# Patient Record
Sex: FEMALE | Race: WHITE | NOT HISPANIC OR LATINO | Employment: FULL TIME | URBAN - METROPOLITAN AREA
[De-identification: names, ages, dates, MRNs, and addresses within clinical notes are randomized per-mention and may not be internally consistent; named-entity substitution may affect disease eponyms.]

---

## 2023-02-03 PROBLEM — R11.2 NAUSEA AND VOMITING: Status: ACTIVE | Noted: 2023-02-03

## 2023-02-03 PROBLEM — K59.09 CHRONIC CONSTIPATION: Status: ACTIVE | Noted: 2023-02-03

## 2023-02-03 PROBLEM — K21.9 GASTROESOPHAGEAL REFLUX DISEASE: Status: ACTIVE | Noted: 2023-02-03

## 2023-02-03 PROBLEM — B96.89 BACTERIAL VAGINOSIS: Status: ACTIVE | Noted: 2023-02-03

## 2023-02-03 PROBLEM — K58.9 IBS (IRRITABLE BOWEL SYNDROME): Status: ACTIVE | Noted: 2023-02-03

## 2023-02-03 PROBLEM — E87.6 HYPOKALEMIA: Status: ACTIVE | Noted: 2023-02-03

## 2023-02-03 PROBLEM — E66.812 CLASS 2 SEVERE OBESITY DUE TO EXCESS CALORIES WITH SERIOUS COMORBIDITY AND BODY MASS INDEX (BMI) OF 39.0 TO 39.9 IN ADULT: Status: ACTIVE | Noted: 2023-02-03

## 2023-02-03 PROBLEM — K62.5 RECTAL BLEEDING: Status: ACTIVE | Noted: 2023-02-03

## 2023-02-03 PROBLEM — N18.2 STAGE 2 CHRONIC KIDNEY DISEASE: Status: ACTIVE | Noted: 2023-02-03

## 2023-02-03 PROBLEM — N89.8 VAGINAL ITCHING: Status: ACTIVE | Noted: 2023-02-03

## 2023-02-03 PROBLEM — N93.9 ABNORMAL UTERINE BLEEDING (AUB): Status: ACTIVE | Noted: 2023-02-03

## 2023-02-03 PROBLEM — K57.32 DIVERTICULITIS, COLON: Status: ACTIVE | Noted: 2023-02-03

## 2023-02-03 PROBLEM — N89.8 VAGINAL DISCHARGE: Status: ACTIVE | Noted: 2023-02-03

## 2023-02-03 PROBLEM — R82.998 URINE LEUKOCYTES: Status: ACTIVE | Noted: 2023-02-03

## 2023-02-03 PROBLEM — I10 ESSENTIAL HYPERTENSION: Status: ACTIVE | Noted: 2023-02-03

## 2023-02-03 PROBLEM — E66.01 CLASS 2 SEVERE OBESITY DUE TO EXCESS CALORIES WITH SERIOUS COMORBIDITY AND BODY MASS INDEX (BMI) OF 39.0 TO 39.9 IN ADULT (MULTI): Status: ACTIVE | Noted: 2023-02-03

## 2023-02-03 PROBLEM — N76.0 BACTERIAL VAGINOSIS: Status: ACTIVE | Noted: 2023-02-03

## 2023-02-03 PROBLEM — R14.0 BLOATING: Status: ACTIVE | Noted: 2023-02-03

## 2023-02-03 PROBLEM — F43.20 ADJUSTMENT DISORDER: Status: ACTIVE | Noted: 2023-02-03

## 2023-02-03 PROBLEM — K44.9 HIATAL HERNIA: Status: ACTIVE | Noted: 2023-02-03

## 2023-02-03 PROBLEM — R10.84 GENERALIZED ABDOMINAL PAIN: Status: ACTIVE | Noted: 2023-02-03

## 2023-02-03 PROBLEM — F41.8 DEPRESSION WITH ANXIETY: Status: ACTIVE | Noted: 2023-02-03

## 2023-02-03 PROBLEM — G47.33 OBSTRUCTIVE SLEEP APNEA SYNDROME: Status: ACTIVE | Noted: 2023-02-03

## 2023-02-03 PROBLEM — F41.9 ANXIETY: Status: ACTIVE | Noted: 2023-02-03

## 2023-02-03 PROBLEM — R39.11 URINARY HESITANCY: Status: ACTIVE | Noted: 2023-02-03

## 2023-02-03 PROBLEM — R19.7 DIARRHEA: Status: ACTIVE | Noted: 2023-02-03

## 2023-02-03 PROBLEM — N92.6 IRREGULAR UTERINE BLEEDING: Status: ACTIVE | Noted: 2023-02-03

## 2023-02-03 PROBLEM — B37.0 ORAL CANDIDIASIS: Status: ACTIVE | Noted: 2023-02-03

## 2023-02-03 RX ORDER — LEVONORGESTREL 52 MG/1
INTRAUTERINE DEVICE INTRAUTERINE
COMMUNITY
Start: 2022-08-17

## 2023-02-03 RX ORDER — SPIRONOLACTONE 25 MG/1
1 TABLET ORAL DAILY
COMMUNITY
Start: 2021-08-25 | End: 2023-03-22 | Stop reason: SDUPTHER

## 2023-02-03 RX ORDER — BUPROPION HYDROCHLORIDE 150 MG/1
1 TABLET ORAL DAILY
COMMUNITY
Start: 2020-11-17 | End: 2023-04-10 | Stop reason: ENTERED-IN-ERROR

## 2023-02-03 RX ORDER — PANTOPRAZOLE SODIUM 40 MG/1
1 TABLET, DELAYED RELEASE ORAL
COMMUNITY
Start: 2020-01-21 | End: 2024-02-06 | Stop reason: DRUGHIGH

## 2023-02-03 RX ORDER — PLECANATIDE 3 MG/1
TABLET ORAL
COMMUNITY
Start: 2022-08-05 | End: 2024-02-09 | Stop reason: ALTCHOICE

## 2023-02-03 RX ORDER — LOSARTAN POTASSIUM 50 MG/1
1 TABLET ORAL DAILY
COMMUNITY
Start: 2019-08-28 | End: 2023-10-12 | Stop reason: WASHOUT

## 2023-02-03 RX ORDER — ONDANSETRON 8 MG/1
1 TABLET, ORALLY DISINTEGRATING ORAL EVERY 12 HOURS PRN
COMMUNITY
Start: 2021-12-21

## 2023-02-03 RX ORDER — LABETALOL 100 MG/1
1 TABLET, FILM COATED ORAL EVERY 12 HOURS
COMMUNITY
End: 2023-07-03 | Stop reason: SDUPTHER

## 2023-02-03 RX ORDER — DICYCLOMINE HYDROCHLORIDE 20 MG/1
1 TABLET ORAL EVERY 6 HOURS PRN
COMMUNITY
Start: 2021-09-14 | End: 2024-04-19 | Stop reason: ALTCHOICE

## 2023-02-03 RX ORDER — HYDROXYZINE PAMOATE 25 MG/1
1-3 CAPSULE ORAL
COMMUNITY
Start: 2020-01-21 | End: 2024-02-09 | Stop reason: ALTCHOICE

## 2023-03-21 PROBLEM — R11.0 NAUSEA IN ADULT: Status: ACTIVE | Noted: 2023-03-21

## 2023-03-21 PROBLEM — Z20.822 SUSPECTED COVID-19 VIRUS INFECTION: Status: ACTIVE | Noted: 2023-03-21

## 2023-03-21 PROBLEM — R05.9 COUGH: Status: ACTIVE | Noted: 2023-03-21

## 2023-03-21 PROBLEM — K59.00 CONSTIPATION: Status: ACTIVE | Noted: 2023-03-21

## 2023-03-21 PROBLEM — G47.00 INSOMNIA: Status: ACTIVE | Noted: 2023-03-21

## 2023-03-21 RX ORDER — GABAPENTIN 600 MG/1
TABLET ORAL EVERY 8 HOURS
COMMUNITY
Start: 2022-06-28 | End: 2023-07-12 | Stop reason: WASHOUT

## 2023-03-21 RX ORDER — DOCUSATE SODIUM 100 MG/1
CAPSULE, LIQUID FILLED ORAL 2 TIMES DAILY
COMMUNITY
Start: 2022-06-28 | End: 2023-07-12 | Stop reason: WASHOUT

## 2023-03-21 RX ORDER — BUSPIRONE HYDROCHLORIDE 10 MG/1
TABLET ORAL 2 TIMES DAILY
COMMUNITY
End: 2023-07-12 | Stop reason: WASHOUT

## 2023-03-21 RX ORDER — NAPROXEN SODIUM 220 MG
TABLET ORAL 2 TIMES DAILY
COMMUNITY
Start: 2022-06-28 | End: 2024-02-09 | Stop reason: ALTCHOICE

## 2023-03-21 NOTE — PROGRESS NOTES
Subjective   Patient ID: Aster Pena is a 43 y.o. female who presents for follow up.    HPI     3/22/2023:  Patient complains of sharp pains in left lateral ribs.  This is more notable with deep inspiration.  She denies any cough, shortness of breath, or wheezing.  She denies any pain or swelling of the lower extremities.    Pt has HTN.  Patient does not monitor BP at home but has a machine that she can use if needed.  Denies CP, SOB, dizziness, and LE edema.   Patient is compliant with antihypertensive therapy and denies any noted side effects.     Patient has a hiatal hernia with GERD.  Her symptoms are stable on the pantoprazole.   Patient denies any breakthrough reflux symptoms.     She has hypokalemia.  Pt is maintained on supplemental potassium once a day.     Pt has anxiety / depression.   She is no longer following with psychiatry.  12/22/2022: Since stopping buspirone, she has noticed her anxiety has increased.  3/22/2023: Patient reports improved symptoms since starting on the 300 mg dose of bupropion      Review of Systems: Constitutional: Patient denies any fever, chills, loss of appetite, or unexplained weight loss.  Cardiovascular: Patient denies any chest pain, shortness of breath with exertion, tachycardia, palpitations, orthopnea, or paroxysmal nocturnal dyspnea.  Respiratory: Patient denies any cough, shortness breath, or wheezing.  Gastrointestinal: Patient denies any nausea, vomiting, diarrhea, constipation, melena, hematochezia, or reflux symptoms.  Musculoskeletal: Patient denies any myalgia, arthralgia, joint swelling, or joint deformity.  Skin: Denies any rashes or skin lesions.   Neurology: Patient denies any new motor or sensory losses. Denies any numbness, tingling, weakness, and incoordination of the extremities. Patient also denies any tremor, seizures, or gait instability.  Endocrinology: Denies any polyuria, polydipsia, polyphagia, or heat/cold intolerance.     Psychiatric:  "Denies any suicidal/homicidal ideation. Pt has anxiety and depression as noted in HPI.     SEE HISTORY OF PRESENT ILLNESS ALSO     Objective   /78   Pulse 100   Temp 36.8 °C (98.2 °F)   Resp 16   Ht 1.575 m (5' 2\")   Wt 96.2 kg (212 lb)   SpO2 96%   BMI 38.78 kg/m²     Physical Exam: Gen. appearance: Alert and cooperative, no acute distress, well-developed/well-nourished obese female.     Neck: Supple and without adenopathy, no JVD at 90° and no carotid bruits are noted. There is no thyromegaly, thyroid tenderness, or palpable thyroid nodules.  Cardiovascular: Regular rate and rhythm without murmur or ectopy.  Respiratory: Lungs are clear to auscultation bilaterally with good air exchange.  Skin: Good turgor, moist, warm and without rashes or lesions.  Neurological exam: Alert and oriented X3, no tremor, normal gait.  Extremities: No clubbing, cyanosis, or edema    MS: There is mild tenderness to palpation of the left lateral ribs and left anterior ribs just below the clavicle. No noted deformity     Psychiatric: Anxious mood and appropriate affect, good insight and judgment, no delusions or thought disorders, no suicidal or homicidal ideation.     Assessment/Plan           Chest  pain  (left lateral ribs) : D-dimer test ordered to evaluate for possible PE.     HTN: Blood pressure appears controlled on in office readings.  We will continue the current medication.  9/22/22: ASCVD risk calculated 1.48% for the next 10 years.     Hiatal hernia / GERD: diagnosed by EGD on January 6, 2020.   Stable based on symptoms.  Pt will continue on pantoprazole and we will continue with the same.  She will follow up with GI, Dr. Denson as needed.     Hypokalemia: Stable based on her most recent labs.  We will continue the potassium supplementation with current dose.     Anxiety:  Stable based on symptoms.  We will continue the current medication without changes.     Obesity: Dietary changes, exercise, and " maintenance of a healthy weight were discussed at length.     Scribe Attestation  By signing my name below, I, Kye Carrero   attest that this documentation has been prepared under the direction and in the presence of Dr. Stack.

## 2023-03-22 ENCOUNTER — LAB (OUTPATIENT)
Dept: LAB | Facility: LAB | Age: 43
End: 2023-03-22
Payer: COMMERCIAL

## 2023-03-22 ENCOUNTER — OFFICE VISIT (OUTPATIENT)
Dept: PRIMARY CARE | Facility: CLINIC | Age: 43
End: 2023-03-22
Payer: COMMERCIAL

## 2023-03-22 VITALS
WEIGHT: 212 LBS | SYSTOLIC BLOOD PRESSURE: 110 MMHG | OXYGEN SATURATION: 96 % | RESPIRATION RATE: 16 BRPM | DIASTOLIC BLOOD PRESSURE: 78 MMHG | BODY MASS INDEX: 39.01 KG/M2 | HEIGHT: 62 IN | TEMPERATURE: 98.2 F | HEART RATE: 100 BPM

## 2023-03-22 DIAGNOSIS — R07.1 CHEST PAIN ON BREATHING: ICD-10-CM

## 2023-03-22 DIAGNOSIS — E66.01 CLASS 2 SEVERE OBESITY WITH SERIOUS COMORBIDITY AND BODY MASS INDEX (BMI) OF 38.0 TO 38.9 IN ADULT, UNSPECIFIED OBESITY TYPE (MULTI): ICD-10-CM

## 2023-03-22 DIAGNOSIS — K44.9 HIATAL HERNIA: ICD-10-CM

## 2023-03-22 DIAGNOSIS — E87.6 HYPOKALEMIA: ICD-10-CM

## 2023-03-22 DIAGNOSIS — F41.9 ANXIETY: ICD-10-CM

## 2023-03-22 DIAGNOSIS — I10 ESSENTIAL HYPERTENSION: Primary | ICD-10-CM

## 2023-03-22 LAB — FIBRIN D-DIMER (NG/ML FEU) IN PLATELET POOR PLASMA: 477 NG/ML FEU

## 2023-03-22 PROCEDURE — 99214 OFFICE O/P EST MOD 30 MIN: CPT | Performed by: FAMILY MEDICINE

## 2023-03-22 PROCEDURE — 3078F DIAST BP <80 MM HG: CPT | Performed by: FAMILY MEDICINE

## 2023-03-22 PROCEDURE — 1036F TOBACCO NON-USER: CPT | Performed by: FAMILY MEDICINE

## 2023-03-22 PROCEDURE — 3074F SYST BP LT 130 MM HG: CPT | Performed by: FAMILY MEDICINE

## 2023-03-22 PROCEDURE — 36415 COLL VENOUS BLD VENIPUNCTURE: CPT

## 2023-03-22 PROCEDURE — 3008F BODY MASS INDEX DOCD: CPT | Performed by: FAMILY MEDICINE

## 2023-03-22 PROCEDURE — 85379 FIBRIN DEGRADATION QUANT: CPT

## 2023-03-22 RX ORDER — BUPROPION HYDROCHLORIDE 300 MG/1
300 TABLET ORAL EVERY MORNING
COMMUNITY
Start: 2022-12-22 | End: 2023-04-10 | Stop reason: SDUPTHER

## 2023-03-22 RX ORDER — SPIRONOLACTONE 25 MG/1
25 TABLET ORAL DAILY
Qty: 90 TABLET | Refills: 0 | Status: SHIPPED | OUTPATIENT
Start: 2023-03-22 | End: 2023-08-01 | Stop reason: SDUPTHER

## 2023-03-24 ENCOUNTER — PATIENT OUTREACH (OUTPATIENT)
Dept: CARE COORDINATION | Facility: CLINIC | Age: 43
End: 2023-03-24
Payer: COMMERCIAL

## 2023-04-04 ENCOUNTER — PATIENT MESSAGE (OUTPATIENT)
Dept: PRIMARY CARE | Facility: CLINIC | Age: 43
End: 2023-04-04
Payer: COMMERCIAL

## 2023-04-04 DIAGNOSIS — R10.30 LOWER ABDOMINAL PAIN: Primary | ICD-10-CM

## 2023-04-06 ENCOUNTER — LAB (OUTPATIENT)
Dept: LAB | Facility: LAB | Age: 43
End: 2023-04-06
Payer: COMMERCIAL

## 2023-04-06 ENCOUNTER — TELEPHONE (OUTPATIENT)
Dept: PRIMARY CARE | Facility: CLINIC | Age: 43
End: 2023-04-06

## 2023-04-06 DIAGNOSIS — R10.30 LOWER ABDOMINAL PAIN: ICD-10-CM

## 2023-04-06 DIAGNOSIS — N39.0 URINARY TRACT INFECTION WITHOUT HEMATURIA, SITE UNSPECIFIED: Primary | ICD-10-CM

## 2023-04-06 LAB
APPEARANCE, URINE: ABNORMAL
BACTERIA, URINE: ABNORMAL /HPF
BILIRUBIN, URINE: NEGATIVE
BLOOD, URINE: NEGATIVE
COLOR, URINE: YELLOW
GLUCOSE, URINE: NEGATIVE MG/DL
KETONES, URINE: NEGATIVE MG/DL
LEUKOCYTE ESTERASE, URINE: ABNORMAL
MUCUS, URINE: ABNORMAL /LPF
NITRITE, URINE: NEGATIVE
PH, URINE: 5 (ref 5–8)
PROTEIN, URINE: NEGATIVE MG/DL
RBC, URINE: ABNORMAL /HPF (ref 0–5)
SPECIFIC GRAVITY, URINE: 1.01 (ref 1–1.03)
SQUAMOUS EPITHELIAL CELLS, URINE: 7 /HPF
UROBILINOGEN, URINE: <2 MG/DL (ref 0–1.9)
WBC, URINE: 38 /HPF (ref 0–5)

## 2023-04-06 PROCEDURE — 81001 URINALYSIS AUTO W/SCOPE: CPT

## 2023-04-06 RX ORDER — NITROFURANTOIN 25; 75 MG/1; MG/1
100 CAPSULE ORAL 2 TIMES DAILY
Qty: 14 CAPSULE | Refills: 0 | Status: SHIPPED | OUTPATIENT
Start: 2023-04-06 | End: 2023-04-13

## 2023-04-06 NOTE — TELEPHONE ENCOUNTER
Advise pt that her urine does show signs of an infection.  We will start her on an antibiotic.    Which pharmacy would she prefer?

## 2023-04-10 DIAGNOSIS — F41.9 ANXIETY: Primary | ICD-10-CM

## 2023-04-10 RX ORDER — BUPROPION HYDROCHLORIDE 300 MG/1
300 TABLET ORAL EVERY MORNING
Qty: 90 TABLET | Refills: 0 | Status: SHIPPED | OUTPATIENT
Start: 2023-04-10 | End: 2023-07-03 | Stop reason: SDUPTHER

## 2023-06-06 ENCOUNTER — PATIENT MESSAGE (OUTPATIENT)
Dept: PRIMARY CARE | Facility: CLINIC | Age: 43
End: 2023-06-06
Payer: COMMERCIAL

## 2023-06-06 DIAGNOSIS — R39.9 UTI SYMPTOMS: Primary | ICD-10-CM

## 2023-06-07 ENCOUNTER — TREATMENT (OUTPATIENT)
Dept: PRIMARY CARE | Facility: CLINIC | Age: 43
End: 2023-06-07
Payer: COMMERCIAL

## 2023-06-07 ENCOUNTER — TELEPHONE (OUTPATIENT)
Dept: PRIMARY CARE | Facility: CLINIC | Age: 43
End: 2023-06-07
Payer: COMMERCIAL

## 2023-06-07 DIAGNOSIS — N39.0 URINARY TRACT INFECTION WITHOUT HEMATURIA, SITE UNSPECIFIED: Primary | ICD-10-CM

## 2023-06-07 RX ORDER — NITROFURANTOIN 25; 75 MG/1; MG/1
100 CAPSULE ORAL 2 TIMES DAILY
Qty: 14 CAPSULE | Refills: 0 | Status: SHIPPED | OUTPATIENT
Start: 2023-06-07 | End: 2023-06-14

## 2023-06-07 NOTE — TELEPHONE ENCOUNTER
Advise pt that her urine looks suspicious for a UTI.  We will start her on an antibiotic while we wait for the culture results.    RX sent to the [facility].

## 2023-06-27 ENCOUNTER — APPOINTMENT (OUTPATIENT)
Dept: PRIMARY CARE | Facility: CLINIC | Age: 43
End: 2023-06-27
Payer: COMMERCIAL

## 2023-07-03 DIAGNOSIS — F43.29 ADJUSTMENT DISORDER WITH OTHER SYMPTOM: Primary | ICD-10-CM

## 2023-07-03 DIAGNOSIS — F41.9 ANXIETY: ICD-10-CM

## 2023-07-03 RX ORDER — LABETALOL 100 MG/1
1 TABLET, FILM COATED ORAL EVERY 12 HOURS
Qty: 90 TABLET | Refills: 0 | Status: SHIPPED | OUTPATIENT
Start: 2023-07-03 | End: 2023-07-03

## 2023-07-03 RX ORDER — BUPROPION HYDROCHLORIDE 300 MG/1
300 TABLET ORAL EVERY MORNING
Qty: 90 TABLET | Refills: 0 | Status: SHIPPED | OUTPATIENT
Start: 2023-07-03 | End: 2023-08-14 | Stop reason: SDUPTHER

## 2023-07-12 ENCOUNTER — OFFICE VISIT (OUTPATIENT)
Dept: PRIMARY CARE | Facility: CLINIC | Age: 43
End: 2023-07-12
Payer: COMMERCIAL

## 2023-07-12 VITALS
HEIGHT: 62 IN | HEART RATE: 94 BPM | BODY MASS INDEX: 38.61 KG/M2 | DIASTOLIC BLOOD PRESSURE: 84 MMHG | TEMPERATURE: 98.1 F | SYSTOLIC BLOOD PRESSURE: 132 MMHG | OXYGEN SATURATION: 95 % | WEIGHT: 209.8 LBS

## 2023-07-12 DIAGNOSIS — K21.9 GASTROESOPHAGEAL REFLUX DISEASE WITHOUT ESOPHAGITIS: ICD-10-CM

## 2023-07-12 DIAGNOSIS — K44.9 HIATAL HERNIA: ICD-10-CM

## 2023-07-12 DIAGNOSIS — E66.01 CLASS 2 SEVERE OBESITY WITH SERIOUS COMORBIDITY AND BODY MASS INDEX (BMI) OF 38.0 TO 38.9 IN ADULT, UNSPECIFIED OBESITY TYPE (MULTI): ICD-10-CM

## 2023-07-12 DIAGNOSIS — I10 ESSENTIAL HYPERTENSION: Primary | ICD-10-CM

## 2023-07-12 DIAGNOSIS — E87.6 HYPOKALEMIA: ICD-10-CM

## 2023-07-12 DIAGNOSIS — R41.3 MEMORY CHANGES: ICD-10-CM

## 2023-07-12 DIAGNOSIS — R35.0 URINARY FREQUENCY: ICD-10-CM

## 2023-07-12 DIAGNOSIS — R82.90 ABNORMAL URINE FINDING: ICD-10-CM

## 2023-07-12 DIAGNOSIS — F41.9 ANXIETY: ICD-10-CM

## 2023-07-12 PROBLEM — R13.12 OROPHARYNGEAL DYSPHAGIA: Status: ACTIVE | Noted: 2023-07-12

## 2023-07-12 PROBLEM — R53.83 FATIGUE: Status: ACTIVE | Noted: 2023-07-12

## 2023-07-12 LAB
POC APPEARANCE, URINE: ABNORMAL
POC BILIRUBIN, URINE: NEGATIVE
POC BLOOD, URINE: NEGATIVE
POC COLOR, URINE: YELLOW
POC GLUCOSE, URINE: NEGATIVE MG/DL
POC KETONES, URINE: NEGATIVE MG/DL
POC LEUKOCYTES, URINE: ABNORMAL
POC NITRITE,URINE: NEGATIVE
POC PH, URINE: 7.5 PH
POC PROTEIN, URINE: NEGATIVE MG/DL
POC SPECIFIC GRAVITY, URINE: 1.01
POC UROBILINOGEN, URINE: 0.2 EU/DL

## 2023-07-12 PROCEDURE — 99214 OFFICE O/P EST MOD 30 MIN: CPT | Performed by: FAMILY MEDICINE

## 2023-07-12 PROCEDURE — 81003 URINALYSIS AUTO W/O SCOPE: CPT | Performed by: FAMILY MEDICINE

## 2023-07-12 PROCEDURE — 1036F TOBACCO NON-USER: CPT | Performed by: FAMILY MEDICINE

## 2023-07-12 PROCEDURE — 3079F DIAST BP 80-89 MM HG: CPT | Performed by: FAMILY MEDICINE

## 2023-07-12 PROCEDURE — 3008F BODY MASS INDEX DOCD: CPT | Performed by: FAMILY MEDICINE

## 2023-07-12 PROCEDURE — 3075F SYST BP GE 130 - 139MM HG: CPT | Performed by: FAMILY MEDICINE

## 2023-07-12 PROCEDURE — 87086 URINE CULTURE/COLONY COUNT: CPT

## 2023-07-12 ASSESSMENT — PATIENT HEALTH QUESTIONNAIRE - PHQ9
1. LITTLE INTEREST OR PLEASURE IN DOING THINGS: NOT AT ALL
2. FEELING DOWN, DEPRESSED OR HOPELESS: NOT AT ALL
SUM OF ALL RESPONSES TO PHQ9 QUESTIONS 1 AND 2: 0

## 2023-07-12 NOTE — PATIENT INSTRUCTIONS
Follow up in 3 months.    It was a pleasure to see you today. Thank you for choosing us for your health care needs.    If you have lab or other testing completed and have not been informed of results within one week, please call the office for your results.    If you haven't done so, consider signing up for [facility] [employer], the [facility] personal health record. Ask the staff how you can get started.

## 2023-07-12 NOTE — PROGRESS NOTES
Subjective   Patient ID: Aster Pena is a 43 y.o. female who presents for Follow-up.    HPI     Patient states she's been having frequent UTIs. The sx she's still experiencing nausea and vomiting, abdominal pain, and urinary frequency.    Patient is also concerned about her memory. She states he forgets events a few minutes after they have happened at times.  Has noted this for the last 3-4 months.  No fam hx of dementia.      Pt has HTN.  Patient does not monitor BP at home but has a machine that she can use if needed.  Denies CP, SOB, dizziness, and LE edema.   Patient is compliant with antihypertensive therapy and denies any noted side effects.     Patient has a hiatal hernia with GERD.  Her symptoms are stable on the pantoprazole.   Patient denies any breakthrough reflux symptoms.     She has hypokalemia.  Pt is maintained on supplemental potassium once a day.     Pt has anxiety / depression.   She was following with a grief group but no longer thought she needed to and symptoms have remained stable.  As previously noted, patient was seen and evaluated by psychiatry at [facility].  She is no longer following with psychiatry.  12/22/2022: Since stopping buspirone, she has noticed her anxiety has increased.      Review of Systems  Constitutional: Patient denies any fever, chills, loss of appetite, or unexplained weight loss.  Cardiovascular: Patient denies any chest pain, shortness of breath with exertion, tachycardia, palpitations, orthopnea, or paroxysmal nocturnal dyspnea.  Respiratory: Patient denies any cough, shortness breath, or wheezing.  Gastrointestinal: Patient denies any nausea, vomiting, diarrhea, constipation, melena, hematochezia, or reflux symptoms.  Skin: Denies any rashes or skin lesions.   Neurology: Patient denies any new motor or sensory losses.  Denies any numbness, tingling, weakness, and incoordination of the extremities.  Patient also denies any tremor, seizures, or gait  "instability.  Endocrinology: Denies any polyuria, polydipsia, polyphagia, or heat/cold intolerance.    SEE HISTORY OF PRESENT ILLNESS ALSO WITH    Objective   /84   Pulse 94   Temp 36.7 °C (98.1 °F)   Ht 1.575 m (5' 2\")   Wt 95.2 kg (209 lb 12.8 oz)   SpO2 95%   BMI 38.37 kg/m²     Physical Exam  General Appearance: Alert and cooperative, in no acute distress, well-developed/well-nourished.  Neck: Supple and without adenopathy or rigidity.  There is no JVD at 90° and no carotid bruits are noted.  There is no thyromegaly, thyroid tenderness, or palpable thyroid nodules.  Heart: Regular rate and rhythm without murmur or ectopy.  Respiratory: Lungs are clear to auscultation bilaterally with good air exchange.  Good respiratory effort and no accessory muscle use.  Skin: Good turgor, moist, warm and without rashes or lesions.  Neurological exam: Alert and oriented ×3, no tremor, normal gait.  Extremities: No clubbing, cyanosis, or edema  Abdomen: Soft, nontender/nondistended.  No masses, guarding, rebound, or rigidity.  No CVA tenderness.        Assessment/Plan     HTN: Blood pressure appears controlled on in office readings.  We will continue the current medication.     Hiatal hernia / GERD: diagnosed by EGD on January 6, 2020.   Stable based on symptoms.  Pt will continue on pantoprazole and we will continue with the same.  She will follow up with GI, Dr. Denson as needed.     Hypokalemia: Stable based on her most recent labs.  We will continue the potassium supplementation with current dose.     Anxiety:  Stable based on symptoms.  Continue the current medications.     Obesity: Dietary changes, exercise, and maintenance of a healthy weight were discussed at length.      Urinary frequency: In office urinalysis revealed small leukocytes but was otherwise negative.  Urine culture has been ordered for further evaluation.  7/12/2023: We will refer her to urology due to her complaints of frequent " UTI.    Memory changes:  7/12/2023: We will refer her to neurology for additional evaluation.      Orders Placed This Encounter   Procedures    Urine Culture    Referral to Urology    Referral to Neurology    POCT UA Automated manually resulted

## 2023-07-14 LAB — URINE CULTURE: NORMAL

## 2023-08-01 DIAGNOSIS — I10 ESSENTIAL HYPERTENSION: ICD-10-CM

## 2023-08-02 RX ORDER — SPIRONOLACTONE 25 MG/1
25 TABLET ORAL DAILY
Qty: 90 TABLET | Refills: 0 | Status: SHIPPED | OUTPATIENT
Start: 2023-08-02 | End: 2023-08-03 | Stop reason: SDUPTHER

## 2023-08-03 DIAGNOSIS — F41.9 ANXIETY: ICD-10-CM

## 2023-08-03 DIAGNOSIS — I10 ESSENTIAL HYPERTENSION: ICD-10-CM

## 2023-08-14 RX ORDER — SPIRONOLACTONE 25 MG/1
25 TABLET ORAL DAILY
Qty: 90 TABLET | Refills: 0 | Status: SHIPPED | OUTPATIENT
Start: 2023-08-14 | End: 2023-08-14

## 2023-08-14 RX ORDER — BUPROPION HYDROCHLORIDE 300 MG/1
300 TABLET ORAL EVERY MORNING
Qty: 90 TABLET | Refills: 0 | Status: SHIPPED | OUTPATIENT
Start: 2023-08-14 | End: 2023-08-14

## 2023-10-08 DIAGNOSIS — F41.9 ANXIETY: ICD-10-CM

## 2023-10-09 ENCOUNTER — PHARMACY VISIT (OUTPATIENT)
Dept: PHARMACY | Facility: CLINIC | Age: 43
End: 2023-10-09
Payer: COMMERCIAL

## 2023-10-09 PROCEDURE — RXMED WILLOW AMBULATORY MEDICATION CHARGE

## 2023-10-11 RX ORDER — LABETALOL 100 MG/1
TABLET, FILM COATED ORAL
Qty: 90 TABLET | Refills: 0 | OUTPATIENT
Start: 2023-10-11 | End: 2024-10-10

## 2023-10-12 ENCOUNTER — PHARMACY VISIT (OUTPATIENT)
Dept: PHARMACY | Facility: CLINIC | Age: 43
End: 2023-10-12
Payer: COMMERCIAL

## 2023-10-12 ENCOUNTER — OFFICE VISIT (OUTPATIENT)
Dept: PRIMARY CARE | Facility: CLINIC | Age: 43
End: 2023-10-12
Payer: COMMERCIAL

## 2023-10-12 VITALS
SYSTOLIC BLOOD PRESSURE: 130 MMHG | BODY MASS INDEX: 37.36 KG/M2 | TEMPERATURE: 97.6 F | DIASTOLIC BLOOD PRESSURE: 80 MMHG | HEART RATE: 95 BPM | OXYGEN SATURATION: 97 % | WEIGHT: 203 LBS | HEIGHT: 62 IN

## 2023-10-12 DIAGNOSIS — F41.9 ANXIETY: ICD-10-CM

## 2023-10-12 DIAGNOSIS — E66.01 CLASS 2 SEVERE OBESITY WITH SERIOUS COMORBIDITY AND BODY MASS INDEX (BMI) OF 38.0 TO 38.9 IN ADULT, UNSPECIFIED OBESITY TYPE (MULTI): ICD-10-CM

## 2023-10-12 DIAGNOSIS — E87.6 HYPOKALEMIA: ICD-10-CM

## 2023-10-12 DIAGNOSIS — K21.9 GASTROESOPHAGEAL REFLUX DISEASE WITHOUT ESOPHAGITIS: ICD-10-CM

## 2023-10-12 DIAGNOSIS — K44.9 HIATAL HERNIA: ICD-10-CM

## 2023-10-12 DIAGNOSIS — I10 ESSENTIAL HYPERTENSION: Primary | ICD-10-CM

## 2023-10-12 PROCEDURE — 3008F BODY MASS INDEX DOCD: CPT | Performed by: FAMILY MEDICINE

## 2023-10-12 PROCEDURE — 3079F DIAST BP 80-89 MM HG: CPT | Performed by: FAMILY MEDICINE

## 2023-10-12 PROCEDURE — 1036F TOBACCO NON-USER: CPT | Performed by: FAMILY MEDICINE

## 2023-10-12 PROCEDURE — 3075F SYST BP GE 130 - 139MM HG: CPT | Performed by: FAMILY MEDICINE

## 2023-10-12 PROCEDURE — 99214 OFFICE O/P EST MOD 30 MIN: CPT | Performed by: FAMILY MEDICINE

## 2023-10-12 RX ORDER — LABETALOL 100 MG/1
TABLET, FILM COATED ORAL
Qty: 90 TABLET | Refills: 0 | Status: SHIPPED | OUTPATIENT
Start: 2023-10-12 | End: 2024-02-18 | Stop reason: SDUPTHER

## 2023-10-12 ASSESSMENT — PATIENT HEALTH QUESTIONNAIRE - PHQ9
1. LITTLE INTEREST OR PLEASURE IN DOING THINGS: NOT AT ALL
SUM OF ALL RESPONSES TO PHQ9 QUESTIONS 1 AND 2: 0
2. FEELING DOWN, DEPRESSED OR HOPELESS: NOT AT ALL

## 2023-10-12 NOTE — PROGRESS NOTES
"Subjective   Patient ID: Aster Pena is a 43 y.o. female who presents for follow up monitoring and management of multiple medical conditions.      HPI     NO NEW CONCERNS  No labs   Colonoscopy 2020        Pt has HTN.  Patient does not monitor BP at home but has a machine that she can use if needed.  Denies CP, SOB, dizziness, and LE edema.   Patient is compliant with antihypertensive therapy and denies any noted side effects.     Patient has a hiatal hernia with GERD.  Her symptoms are stable on the pantoprazole.   Patient denies any breakthrough reflux symptoms.     She has hypokalemia.  Pt is maintained on supplemental potassium once a day.     Pt has anxiety / depression.   She was following with a grief group but no longer thought she needed to and symptoms have remained stable.  As previously noted, patient was seen and evaluated by psychiatry at [facility].  She is no longer following with psychiatry.      DECLINES FLU VACCINE    Review of Systems  Constitutional: Patient denies any fever, chills, loss of appetite, or unexplained weight loss.  Cardiovascular: Patient denies any chest pain, shortness of breath with exertion, tachycardia, palpitations, orthopnea, or paroxysmal nocturnal dyspnea.  Respiratory: Patient denies any cough, shortness breath, or wheezing.  Gastrointestinal: Patient denies any nausea, vomiting, diarrhea, constipation, melena, hematochezia, or reflux symptoms.  Skin: Denies any rashes or skin lesions.   Neurology: Patient denies any new motor or sensory losses.  Denies any numbness, tingling, weakness, and incoordination of the extremities.  Patient also denies any tremor, seizures, or gait instability.  Endocrinology: Denies any polyuria, polydipsia, polyphagia, or heat/cold intolerance.      Objective   /80   Pulse 95   Temp 36.4 °C (97.6 °F)   Ht 1.575 m (5' 2\")   Wt 92.1 kg (203 lb)   SpO2 97%   BMI 37.13 kg/m²     Physical Exam  General Appearance: Alert and " cooperative, in no acute distress, well-developed/well-nourished.  Neck: Supple and without adenopathy or rigidity.  There is no JVD at 90° and no carotid bruits are noted.  There is no thyromegaly, thyroid tenderness, or palpable thyroid nodules.  Heart: Regular rate and rhythm without murmur or ectopy.  Respiratory: Lungs are clear to auscultation bilaterally with good air exchange.  Good respiratory effort and no accessory muscle use.  Skin: Good turgor, moist, warm and without rashes or lesions.  Neurological exam: Alert and oriented ×3, no tremor, normal gait.  Extremities: No clubbing, cyanosis, or edema    Assessment/Plan     WILL GET FLU VACCINE AT WORK.    HTN: Blood pressure appears controlled on in office readings.  We will continue the current medication.     Hiatal hernia / GERD:  Stable based on symptoms.  Pt will continue on pantoprazole and we will continue with the same.  She will follow up with GI, Dr. Denson as needed.     Hypokalemia: Stable based on her most recent labs.  We will continue the potassium supplementation with current dose.     Anxiety:  Stable based on symptoms.  Continue the current medications.     Obesity: Dietary changes, exercise, and maintenance of a healthy weight were discussed at length.       Memory changes:  7/12/2023: She was referred to neurology for additional evaluation.  10/12/2023:  Has not yet scheduled appt with neurology for evaluation yet.      Orders Placed This Encounter   Procedures    Comprehensive Metabolic Panel    Lipid Panel     Requested Prescriptions     Signed Prescriptions Disp Refills    labetalol (Normodyne) 100 mg tablet 90 tablet 0     Sig: TAKE 1 TABLET (100 MG) BY MOUTH EVERY 12 HOURS.

## 2023-10-12 NOTE — PATIENT INSTRUCTIONS
Get your flu vaccine at work.      Follow up in 3 months with labs to be done PRIOR.    It was a pleasure to see you today. Thank you for choosing us for your health care needs.    If you have lab or other testing completed and have not been informed of results within one week, please call the office for your results.    If you haven't done so, consider signing up for [facility] [employer], the [facility] personal health record. Ask the staff how you can get started.

## 2023-10-18 ENCOUNTER — APPOINTMENT (OUTPATIENT)
Dept: GASTROENTEROLOGY | Facility: CLINIC | Age: 43
End: 2023-10-18
Payer: COMMERCIAL

## 2023-10-30 ENCOUNTER — PHARMACY VISIT (OUTPATIENT)
Dept: PHARMACY | Facility: CLINIC | Age: 43
End: 2023-10-30
Payer: COMMERCIAL

## 2023-10-30 PROCEDURE — RXMED WILLOW AMBULATORY MEDICATION CHARGE

## 2023-12-07 ENCOUNTER — LAB (OUTPATIENT)
Dept: LAB | Facility: LAB | Age: 43
End: 2023-12-07
Payer: COMMERCIAL

## 2023-12-07 DIAGNOSIS — R39.9 UTI SYMPTOMS: ICD-10-CM

## 2023-12-07 LAB
APPEARANCE UR: ABNORMAL
BACTERIA #/AREA URNS AUTO: ABNORMAL /HPF
BILIRUB UR STRIP.AUTO-MCNC: NEGATIVE MG/DL
COLOR UR: YELLOW
GLUCOSE UR STRIP.AUTO-MCNC: NEGATIVE MG/DL
KETONES UR STRIP.AUTO-MCNC: NEGATIVE MG/DL
LEUKOCYTE ESTERASE UR QL STRIP.AUTO: ABNORMAL
NITRITE UR QL STRIP.AUTO: NEGATIVE
PH UR STRIP.AUTO: 7 [PH]
PROT UR STRIP.AUTO-MCNC: NEGATIVE MG/DL
RBC # UR STRIP.AUTO: NEGATIVE /UL
RBC #/AREA URNS AUTO: ABNORMAL /HPF
SP GR UR STRIP.AUTO: 1.01
SQUAMOUS #/AREA URNS AUTO: ABNORMAL /HPF
UROBILINOGEN UR STRIP.AUTO-MCNC: <2 MG/DL
WBC #/AREA URNS AUTO: ABNORMAL /HPF

## 2023-12-07 PROCEDURE — 81001 URINALYSIS AUTO W/SCOPE: CPT

## 2024-01-02 DIAGNOSIS — I10 ESSENTIAL HYPERTENSION: ICD-10-CM

## 2024-01-02 DIAGNOSIS — F41.9 ANXIETY: ICD-10-CM

## 2024-01-02 PROCEDURE — RXMED WILLOW AMBULATORY MEDICATION CHARGE

## 2024-01-06 PROCEDURE — RXMED WILLOW AMBULATORY MEDICATION CHARGE

## 2024-01-06 RX ORDER — BUPROPION HYDROCHLORIDE 300 MG/1
300 TABLET ORAL
Qty: 90 TABLET | Refills: 0 | Status: SHIPPED | OUTPATIENT
Start: 2024-01-06 | End: 2024-05-14 | Stop reason: SDUPTHER

## 2024-01-06 RX ORDER — SPIRONOLACTONE 25 MG/1
25 TABLET ORAL DAILY
Qty: 90 TABLET | Refills: 0 | Status: SHIPPED | OUTPATIENT
Start: 2024-01-06 | End: 2024-05-14 | Stop reason: SDUPTHER

## 2024-01-11 ENCOUNTER — PHARMACY VISIT (OUTPATIENT)
Dept: PHARMACY | Facility: CLINIC | Age: 44
End: 2024-01-11
Payer: COMMERCIAL

## 2024-01-18 ENCOUNTER — APPOINTMENT (OUTPATIENT)
Dept: PRIMARY CARE | Facility: CLINIC | Age: 44
End: 2024-01-18
Payer: COMMERCIAL

## 2024-02-02 ENCOUNTER — LAB (OUTPATIENT)
Dept: LAB | Facility: LAB | Age: 44
End: 2024-02-02
Payer: COMMERCIAL

## 2024-02-02 DIAGNOSIS — I10 ESSENTIAL HYPERTENSION: ICD-10-CM

## 2024-02-02 DIAGNOSIS — E87.6 HYPOKALEMIA: ICD-10-CM

## 2024-02-02 LAB
ALBUMIN SERPL BCP-MCNC: 4 G/DL (ref 3.4–5)
ALP SERPL-CCNC: 66 U/L (ref 33–110)
ALT SERPL W P-5'-P-CCNC: 10 U/L (ref 7–45)
ANION GAP SERPL CALC-SCNC: 12 MMOL/L (ref 10–20)
AST SERPL W P-5'-P-CCNC: 14 U/L (ref 9–39)
BILIRUB SERPL-MCNC: 0.5 MG/DL (ref 0–1.2)
BUN SERPL-MCNC: 9 MG/DL (ref 6–23)
CALCIUM SERPL-MCNC: 8.7 MG/DL (ref 8.6–10.3)
CHLORIDE SERPL-SCNC: 101 MMOL/L (ref 98–107)
CO2 SERPL-SCNC: 29 MMOL/L (ref 21–32)
CREAT SERPL-MCNC: 0.9 MG/DL (ref 0.5–1.05)
EGFRCR SERPLBLD CKD-EPI 2021: 82 ML/MIN/1.73M*2
GLUCOSE SERPL-MCNC: 96 MG/DL (ref 74–99)
POTASSIUM SERPL-SCNC: 3.7 MMOL/L (ref 3.5–5.3)
PROT SERPL-MCNC: 7 G/DL (ref 6.4–8.2)
SODIUM SERPL-SCNC: 138 MMOL/L (ref 136–145)

## 2024-02-02 PROCEDURE — 80053 COMPREHEN METABOLIC PANEL: CPT

## 2024-02-02 PROCEDURE — 36415 COLL VENOUS BLD VENIPUNCTURE: CPT

## 2024-02-06 ENCOUNTER — OFFICE VISIT (OUTPATIENT)
Dept: GASTROENTEROLOGY | Facility: CLINIC | Age: 44
End: 2024-02-06
Payer: COMMERCIAL

## 2024-02-06 VITALS
WEIGHT: 198 LBS | HEIGHT: 62 IN | DIASTOLIC BLOOD PRESSURE: 90 MMHG | BODY MASS INDEX: 36.44 KG/M2 | SYSTOLIC BLOOD PRESSURE: 136 MMHG | HEART RATE: 98 BPM

## 2024-02-06 DIAGNOSIS — K21.9 GASTROESOPHAGEAL REFLUX DISEASE WITHOUT ESOPHAGITIS: Primary | ICD-10-CM

## 2024-02-06 DIAGNOSIS — R11.14 BILIOUS VOMITING WITH NAUSEA: ICD-10-CM

## 2024-02-06 PROCEDURE — 3008F BODY MASS INDEX DOCD: CPT | Performed by: INTERNAL MEDICINE

## 2024-02-06 PROCEDURE — 3080F DIAST BP >= 90 MM HG: CPT | Performed by: INTERNAL MEDICINE

## 2024-02-06 PROCEDURE — 1036F TOBACCO NON-USER: CPT | Performed by: INTERNAL MEDICINE

## 2024-02-06 PROCEDURE — 3075F SYST BP GE 130 - 139MM HG: CPT | Performed by: INTERNAL MEDICINE

## 2024-02-06 PROCEDURE — 99214 OFFICE O/P EST MOD 30 MIN: CPT | Performed by: INTERNAL MEDICINE

## 2024-02-06 RX ORDER — PANTOPRAZOLE SODIUM 40 MG/1
40 TABLET, DELAYED RELEASE ORAL
Qty: 180 TABLET | Refills: 1 | Status: SHIPPED | OUTPATIENT
Start: 2024-02-06 | End: 2025-02-05

## 2024-02-06 NOTE — PATIENT INSTRUCTIONS
Schedule EGD and Gastric emptying study ([phone]).  Increase pantoprazole to twice daily 1/2 hr prior to meals.  Return to clinic in 2 months.

## 2024-02-06 NOTE — PROGRESS NOTES
Gastroenterology Office Visit     History of Present Illness:   Aster Pena is a 43 y.o. female who presents to GI clinic for follow up of n/v.  Since last OV in 4/23, patient has continued pantoprazole daily.        N/v intermittently for years, worse over last 6-8 months. Vomiting is mostly bilious, non-bloody.  Sometimes vomits food, but never from the day before. Associated with a 10 lbs wt loss over 6 months.  Also gets b/t HB 2-3x/ wk despite pantoprazole qam for several years.        Has seen Marina Orantes, Justyn, and Ruth Ann Agustin in the past.      Denies MJ use.  No prior GES.        OV with Liudmilata in 4/23:  Aster Pena is a 44yo female with a PMH of depression/anxiety, GERD, diverticulitis, HTN who presents to clinic for follow-up of multiple GI symptoms including GERD, chronic nausea, dyspepsia, and IBS-M, and rectal bleeding. + New oropharyngeal dysphagia. Patient states that most of her symptoms have improved/resolved. She does have a new symptom of oropharyngeal dysphagia. She says that it was happening sparingly, but now happens almost daily. She has no other concerns at this time. Occasional hemorrhoidal bleeding has significantly improved.     Review of Systems  Constitutional: denies fever/ chills, night sweats, +wt loss   Respiratory: denies SOB, MENDOZA  CV: denies chest pain and LE edema  Neuro: denies weakness and difficulty walking      Past Medical History   has a past medical history of Diaphragmatic hernia without obstruction or gangrene (12/22/2022), Hypokalemia (12/22/2022), Personal history of other diseases of the circulatory system (12/30/2020), Personal history of other diseases of the nervous system and sense organs (12/30/2020), and Personal history of other specified conditions (11/11/2020).     Problem List  Patient Active Problem List   Diagnosis    Abnormal uterine bleeding (AUB)    Adjustment disorder    Anxiety    Bacterial vaginosis    Chronic constipation     "Bloating    Diarrhea    Depression with anxiety    Diverticulitis, colon    Essential hypertension    Gastroesophageal reflux disease    Generalized abdominal pain    Hiatal hernia    Hypokalemia    IBS (irritable bowel syndrome)    Irregular uterine bleeding    Nausea and vomiting    Obstructive sleep apnea syndrome    Oral candidiasis    Rectal bleeding    Stage 2 chronic kidney disease    Urinary hesitancy    Urine leukocytes    Vaginal discharge    Vaginal itching    Class 2 severe obesity with serious comorbidity and body mass index (BMI) of 38.0 to 38.9 in adult (CMS/[facility])    Cough    Insomnia    Suspected COVID-19 virus infection    Constipation    Nausea in adult    Fatigue    Oropharyngeal dysphagia       Past Surgical History  Past Surgical History:   Procedure Laterality Date    OTHER SURGICAL HISTORY  08/28/2019    Heart surgery    OTHER SURGICAL HISTORY  07/12/2022    Ovarian cystectomy    OTHER SURGICAL HISTORY  07/12/2022    Enterolysis    OTHER SURGICAL HISTORY  07/12/2022    Intrauterine device placement       Social History   reports that she quit smoking about 10 years ago. Her smoking use included cigarettes. She has a 20.00 pack-year smoking history. She has never used smokeless tobacco. She reports current alcohol use. She reports that she does not use drugs.     Family History  family history includes Brain cancer in her father; Hypertension in her sister.       Allergies  Allergies   Allergen Reactions    Acetaminophen-Codeine Hallucinations    Codeine Other     Tylenol with Codeine tablets causes hallucinations    Hydromorphone Other     \"Extreme vomiting\"    Paroxetine Hcl Nausea Only and Unknown     Nausea, vomiting, shakiness    Amoxicillin Rash       Medications  Current Outpatient Medications   Medication Instructions    buPROPion XL (Wellbutrin XL) 300 mg 24 hr tablet TAKE 1 TABLET BY MOUTH EVERY MORNING    dicyclomine (Bentyl) 20 mg tablet 1 tablet, oral, Every 6 hours PRN    " "hydrOXYzine pamoate (Vistaril) 25 mg capsule 1-3 capsules, oral, per night, 30 minutes prior to sleep    L. acidophilus/Bifid. animalis 32 billion cell capsule oral    labetalol (Normodyne) 100 mg tablet TAKE 1 TABLET (100 MG) BY MOUTH EVERY 12 HOURS.    levonorgestrel (Mirena) 20 mcg/24 hours (8 yrs) 52 mg IUD intrauterine    naproxen sodium (Aleve) 220 mg tablet oral, 2 times daily    ondansetron ODT (ZOFRAN-ODT) 1 mg, oral, Every 12 hours PRN    pantoprazole (PROTONIX) 40 mg, oral, 2 times daily before meals    plecanatide (Trulance) tablet tablet oral    psyllium seed, with sugar, (FIBER ORAL) 1 capsule, oral, Daily    spironolactone (Aldactone) 25 mg tablet TAKE 1 TABLET BY MOUTH ONCE DAILY        Objective   Blood pressure 136/90, pulse 98, height 1.575 m (5' 2\"), weight 89.8 kg (198 lb).      General: no acute distress, well-nourished  Skin: no jaundice, rash or liver stigmata  HEENT: no icterus/ eye redness, no oral lesions  Respiratory: normal breath sounds bilaterally, no wheezes or rales  CV: RRR, no murmurs; no LE edema    LABS  Component      Latest Ref Rng 2/2/2024   Creatinine      0.50 - 1.05 mg/dL 0.90    EGFR      >60 mL/min/1.73m*2 82    Calcium      8.6 - 10.3 mg/dL 8.7    Albumin      3.4 - 5.0 g/dL 4.0    Alkaline Phosphatase      33 - 110 U/L 66    Total Protein      6.4 - 8.2 g/dL 7.0    AST      9 - 39 U/L 14    Bilirubin Total      0.0 - 1.2 mg/dL 0.5    ALT      7 - 45 U/L 10      TSH normal 11/22      Radiology  CT A/P 12/22:  no PE, small HH, tics      Endoscopy    Colonoscopy 1/20 with Lamberto: sigmoid tics, 3-mm rectal HP polyp  EGD 1/20 with Lamberto for GERD: 2-cm HH, 3 possible tongues of Ornelas's (not confirmed on bx); S/D normal    Assessment/Plan   Aster Pena is a 43 y.o. female who presents to GI clinic for worsening n/v x 6-8 months.    Nausea and vomiting- EGD in 2020, CT in 12/22 w/o SBO.  Never had GES. Denies MJ use.    Schedule EGD and Gastric emptying study " ([phone]).  Increase pantoprazole to twice daily 1/2 hr prior to meals.  Return to clinic in 2 months.         Federico Valderrama MD

## 2024-02-06 NOTE — ASSESSMENT & PLAN NOTE
Schedule EGD and Gastric emptying study ([phone]).  Increase pantoprazole to twice daily 1/2 hr prior to meals.  Return to clinic in 2 months.

## 2024-02-07 ENCOUNTER — ANESTHESIA EVENT (OUTPATIENT)
Dept: GASTROENTEROLOGY | Facility: EXTERNAL LOCATION | Age: 44
End: 2024-02-07

## 2024-02-09 ENCOUNTER — ANESTHESIA (OUTPATIENT)
Dept: GASTROENTEROLOGY | Facility: EXTERNAL LOCATION | Age: 44
End: 2024-02-09

## 2024-02-09 ENCOUNTER — HOSPITAL ENCOUNTER (OUTPATIENT)
Dept: GASTROENTEROLOGY | Facility: EXTERNAL LOCATION | Age: 44
Discharge: HOME | End: 2024-02-09
Payer: COMMERCIAL

## 2024-02-09 VITALS
OXYGEN SATURATION: 99 % | WEIGHT: 198 LBS | RESPIRATION RATE: 14 BRPM | SYSTOLIC BLOOD PRESSURE: 127 MMHG | HEIGHT: 62 IN | HEART RATE: 90 BPM | TEMPERATURE: 97.9 F | BODY MASS INDEX: 36.44 KG/M2 | DIASTOLIC BLOOD PRESSURE: 90 MMHG

## 2024-02-09 DIAGNOSIS — R11.14 BILIOUS VOMITING WITH NAUSEA: Primary | ICD-10-CM

## 2024-02-09 LAB — PREGNANCY TEST URINE, POC: NEGATIVE

## 2024-02-09 PROCEDURE — 81025 URINE PREGNANCY TEST: CPT | Performed by: INTERNAL MEDICINE

## 2024-02-09 PROCEDURE — 43235 EGD DIAGNOSTIC BRUSH WASH: CPT | Performed by: INTERNAL MEDICINE

## 2024-02-09 RX ORDER — SODIUM CHLORIDE 9 MG/ML
20 INJECTION, SOLUTION INTRAVENOUS CONTINUOUS
Status: DISCONTINUED | OUTPATIENT
Start: 2024-02-09 | End: 2024-02-10 | Stop reason: HOSPADM

## 2024-02-09 RX ORDER — SODIUM CHLORIDE 9 MG/ML
INJECTION, SOLUTION INTRAVENOUS CONTINUOUS PRN
Status: DISCONTINUED | OUTPATIENT
Start: 2024-02-09 | End: 2024-02-09

## 2024-02-09 RX ORDER — PROPOFOL 10 MG/ML
INJECTION, EMULSION INTRAVENOUS AS NEEDED
Status: DISCONTINUED | OUTPATIENT
Start: 2024-02-09 | End: 2024-02-09

## 2024-02-09 RX ORDER — ONDANSETRON HYDROCHLORIDE 2 MG/ML
4 INJECTION, SOLUTION INTRAVENOUS ONCE AS NEEDED
Status: DISCONTINUED | OUTPATIENT
Start: 2024-02-09 | End: 2024-02-10 | Stop reason: HOSPADM

## 2024-02-09 RX ORDER — LIDOCAINE HYDROCHLORIDE 20 MG/ML
INJECTION, SOLUTION INFILTRATION; PERINEURAL AS NEEDED
Status: DISCONTINUED | OUTPATIENT
Start: 2024-02-09 | End: 2024-02-09

## 2024-02-09 RX ADMIN — PROPOFOL 50 MG: 10 INJECTION, EMULSION INTRAVENOUS at 12:50

## 2024-02-09 RX ADMIN — SODIUM CHLORIDE: 9 INJECTION, SOLUTION INTRAVENOUS at 12:46

## 2024-02-09 RX ADMIN — LIDOCAINE HYDROCHLORIDE 4 ML: 20 INJECTION, SOLUTION INFILTRATION; PERINEURAL at 12:48

## 2024-02-09 RX ADMIN — PROPOFOL 50 MG: 10 INJECTION, EMULSION INTRAVENOUS at 12:49

## 2024-02-09 RX ADMIN — PROPOFOL 100 MG: 10 INJECTION, EMULSION INTRAVENOUS at 12:48

## 2024-02-09 SDOH — HEALTH STABILITY: MENTAL HEALTH: CURRENT SMOKER: 0

## 2024-02-09 ASSESSMENT — COLUMBIA-SUICIDE SEVERITY RATING SCALE - C-SSRS
2. HAVE YOU ACTUALLY HAD ANY THOUGHTS OF KILLING YOURSELF?: NO
6. HAVE YOU EVER DONE ANYTHING, STARTED TO DO ANYTHING, OR PREPARED TO DO ANYTHING TO END YOUR LIFE?: NO
1. IN THE PAST MONTH, HAVE YOU WISHED YOU WERE DEAD OR WISHED YOU COULD GO TO SLEEP AND NOT WAKE UP?: NO

## 2024-02-09 ASSESSMENT — PAIN - FUNCTIONAL ASSESSMENT
PAIN_FUNCTIONAL_ASSESSMENT: 0-10

## 2024-02-09 ASSESSMENT — PAIN SCALES - GENERAL
PAINLEVEL_OUTOF10: 0 - NO PAIN
PAINLEVEL_OUTOF10: 0 - NO PAIN
PAIN_LEVEL: 0
PAINLEVEL_OUTOF10: 0 - NO PAIN
PAINLEVEL_OUTOF10: 0 - NO PAIN

## 2024-02-09 NOTE — H&P
"Outpatient Hospital Procedure H&P    Patient Profile-Procedures  Initial Info  Patient Demographics  Name Aster Pena  Date of Birth 1980  MRN [id number]  Address   [address]   [place] [id number]   [place] [address]    Primary Phone Number [phone]  Secondary Phone Number    PCP Addison Stack    Procedure(s):  EGD  Primary contact name and number   Extended Emergency Contact Information  Primary Emergency Contact: Tana Pugh  Home Phone: [phone]  Relation: Parent    General Health  Weight   Vitals:    02/09/24 1211   Weight: 89.8 kg (198 lb)     BMI Body mass index is 36.21 kg/m².    Allergies  Allergies   Allergen Reactions    Acetaminophen-Codeine Hallucinations    Codeine Other     Tylenol with Codeine tablets causes hallucinations    Hydromorphone Other     \"Extreme vomiting\"    Paroxetine Hcl Nausea Only and Unknown     Nausea, vomiting, shakiness    Amoxicillin Rash       Past Medical History   Past Medical History:   Diagnosis Date    Diaphragmatic hernia without obstruction or gangrene 12/22/2022    Hiatal hernia    Hypertension     Hypokalemia 12/22/2022    Hypokalemia    Personal history of other diseases of the circulatory system 12/30/2020    History of hypertension    Personal history of other diseases of the nervous system and sense organs 12/30/2020    History of sleep apnea    Personal history of other specified conditions 11/11/2020    History of insomnia       Provider assessment  Diagnosis: n/v    Medication Reviewed - yes  Prior to Admission medications    Medication Sig Start Date End Date Taking? Authorizing Provider   buPROPion XL (Wellbutrin XL) 300 mg 24 hr tablet TAKE 1 TABLET BY MOUTH EVERY MORNING 1/6/24 1/5/25 Yes Addison Stack, DO   L. acidophilus/Bifid. animalis 32 billion cell capsule Take by mouth. 8/28/19  Yes Historical Provider, MD   labetalol (Normodyne) 100 mg tablet TAKE 1 TABLET (100 MG) BY MOUTH EVERY 12 " HOURS. 10/12/23 10/11/24 Yes Addison Stack,    levonorgestrel (Mirena) 20 mcg/24 hours (8 yrs) 52 mg IUD by intrauterine route. 8/17/22  Yes Historical Provider, MD   ondansetron ODT (Zofran-ODT) 8 mg disintegrating tablet Take 1 mg by mouth every 12 (twelve) hours if needed. 12/21/21  Yes Historical Provider, MD   pantoprazole (ProtoNix) 40 mg EC tablet Take 1 tablet (40 mg) by mouth 2 times a day before meals. 2/6/24 2/5/25 Yes Federico Valderrama MD   spironolactone (Aldactone) 25 mg tablet TAKE 1 TABLET BY MOUTH ONCE DAILY 1/6/24 1/5/25 Yes Addison Stack,    dicyclomine (Bentyl) 20 mg tablet Take 1 tablet (20 mg) by mouth every 6 hours if needed. 9/14/21   Historical Provider, MD   hydrOXYzine pamoate (Vistaril) 25 mg capsule Take 1-3 capsules (25-75 mg) by mouth. per night, 30 minutes prior to sleep 1/21/20 2/9/24  Historical Provider, MD   naproxen sodium (Aleve) 220 mg tablet Take by mouth twice a day. 6/28/22 2/9/24  Historical Provider, MD   pantoprazole (ProtoNix) 40 mg EC tablet Take 1 tablet (40 mg) by mouth. EVERY MORNING 1/21/20 2/6/24  Historical Provider, MD   pantoprazole (ProtoNix) 40 mg EC tablet TAKE 1 TABLET BY MOUTH EVERY MORNING 9/21/23 2/6/24  Jeremie Alejandra, DO   plecanatide (Trulance) tablet tablet Take by mouth. 8/5/22 2/9/24  Historical Provider, MD   psyllium seed, with sugar, (FIBER ORAL) Take 1 capsule by mouth in the morning. 7/14/22 2/9/24  Historical Provider, MD       Physical Exam  Vitals:    02/09/24 1211   BP: 138/88   Pulse: 99   Resp: 21   Temp: 36.7 °C (98.1 °F)   SpO2: 97%        General: A&Ox3, NAD.  HEENT: AT/NC.   CV: RRR. No murmur.  Resp: CTA bilaterally. No wheezing, rhonchi or rales.   GI: Soft, NT/ND. BSx4.  Extrem: No edema. Pulses intact.  Skin: No Jaundice.   Neuro: No focal deficits.   Psych: Normal mood and affect.        Oropharyngeal Classification III (soft and hard palate and base of uvula visible)  ASA PS Classification 3  Sedation Plan  Deep  Procedure Plan - pre-procedural (re)assesment completed by physician:  discharge/transfer patient when discharge criteria met    Federico Valderrama MD  2/9/2024 12:36 PM

## 2024-02-09 NOTE — DISCHARGE INSTRUCTIONS
Patient Instructions Post Procedure      The anesthetics, sedatives or narcotics which were given to you today will be acting in your body for the next 24 hours, so you might feel a little sleepy or groggy.  This feeling should slowly wear off. Carefully read and follow the instructions.     You received sedation today:  - Do not drive or operate any machinery or power tools of any kind.   - No alcoholic beverages today, not even beer or wine.  - Do not make any important decisions or sign any legal documents.  - No over the counter medications that contain alcohol or that may cause drowsiness.    While it is common to experience mild to moderate abdominal distention, gas, or belching after your procedure, if any of these symptoms occur following discharge from the GI Lab or within one week of having your procedure, call the [facility] to be advised whether a visit to your nearest Urgent Care or Emergency Department is indicated.  Take this paper with you if you go.   - If you develop an allergic reaction to the medications that were given during your procedure such as difficulty breathing, rash, hives, severe nausea, vomiting or lightheadedness.  - If you experience chest pain, shortness of breath, severe abdominal pain, fevers and chills.  -If you develop signs and symptoms of bleeding such as blood in your spit, if your stools turn black, tarry, or bloody  - If you have not urinated within 8 hours following your procedure.  - If your IV site becomes painful, red, inflamed, or looks infected.    If you received a biopsy/polypectomy/sphincterotomy the following instructions apply below:  __ Do not use Aspirin containing products, non-steroidal medications or anti-coagulants for one week following your procedure. (Examples of these types of medications are: Advil, Arthrotec, Aleve, Coumadin, Ecotrin, Heparin, Ibuprofen, Indocin, Motrin, Naprosyn, Nuprin, Plavix, Vioxx, and Voltarin, or their generic  forms.  This list is not all-inclusive.  Check with your physician or pharmacist before resuming medications.)   __ Eat a soft diet today.  Avoid foods that are poorly digested for the next 24 hours.  These foods would include: nuts, beans, lettuce, red meats, and fried foods. Start with liquids and advance your diet as tolerated, gradually work up to eating solids.   __ Do not have a Barium Study or Enema for one week.    Your physician recommends the additional following instructions:    -You have a contact number available for emergencies. The signs and symptoms of potential delayed complications were discussed with you. You may return to normal activities tomorrow.  -Resume your previous diet or other if specified.  -Continue your present medications.   -We are waiting for your pathology results, if applicable.  -The findings and recommendations have been discussed with you and/or family.  - Please see Medication Reconciliation Form for new medication/medications prescribed.     If you experience any problems or have any questions following discharge from the GI Lab, please call: [phone] from 7 am- 4:30 pm.  In the event of an emergency please go to the closest Emergency Department or call Dr. Valderrama

## 2024-02-09 NOTE — Clinical Note
Patient tolerated the procedure well and is comfortable with no complaints of pain. Vital signs stable. Arousable prior to transport. Patient transported to PACU via stretcher. Handoff completed. Abd soft nontender

## 2024-02-09 NOTE — ANESTHESIA PREPROCEDURE EVALUATION
Patient: Aster Pena    Procedure Information       Date/Time: 02/09/24 1230    Scheduled providers: Federico Valderrama MD    Procedure: EGD    Location: [facility]            Relevant Problems   Anesthesia (within normal limits)      Cardiovascular   (+) Essential hypertension      Endocrine   (+) Class 2 severe obesity with serious comorbidity and body mass index (BMI) of 38.0 to 38.9 in adult (CMS/HCC)      GI   (+) Gastroesophageal reflux disease   (+) Hiatal hernia   (+) IBS (irritable bowel syndrome)   (+) Rectal bleeding      /Renal   (+) Stage 2 chronic kidney disease      Neuro/Psych   (+) Anxiety   (+) Depression with anxiety      Pulmonary   (+) Obstructive sleep apnea syndrome      GI/Hepatic (within normal limits)      Hematology (within normal limits)      Musculoskeletal (within normal limits)      Eyes, Ears, Nose, and Throat (within normal limits)      Infectious Disease   (+) Bacterial vaginosis   (+) Oral candidiasis       Clinical information reviewed:   Tobacco  Allergies  Meds   Med Hx  Surg Hx  OB Status  Fam Hx  Soc   Hx        NPO Detail:  NPO/Void Status  Date of Last Liquid: 02/08/24  Time of Last Liquid: 2200  Date of Last Solid: 02/08/24  Time of Last Solid: 1900  Last Intake Type: Solid meal         Physical Exam    Airway  Mallampati: II  TM distance: >3 FB  Neck ROM: full     Cardiovascular - normal exam  Rhythm: regular  Rate: normal     Dental - normal exam     Pulmonary - normal exam  Breath sounds clear to auscultation     Abdominal            Anesthesia Plan    History of general anesthesia?: yes  History of complications of general anesthesia?: no    ASA 2     MAC     The patient is not a current smoker.    intravenous induction   Anesthetic plan and risks discussed with patient.    Plan discussed with CRNA.

## 2024-02-09 NOTE — ANESTHESIA POSTPROCEDURE EVALUATION
Patient: Aster Pena    Procedure Summary       Date: 02/09/24 Room / Location: [facility]    Anesthesia Start: 1246 Anesthesia Stop:     Procedure: EGD Diagnosis:       Bilious vomiting with nausea      Bilious vomiting with nausea    Scheduled Providers: Federico Valderrama MD Responsible Provider: ITALIA Chun    Anesthesia Type: MAC ASA Status: 2            Anesthesia Type: MAC    Vitals Value Taken Time   BP 96/66 02/09/24 1258   Temp 36.6 °C (97.9 °F) 02/09/24 1256   Pulse 92 02/09/24 1256   Resp 25 02/09/24 1256   SpO2 96 % 02/09/24 1256       Anesthesia Post Evaluation    Patient location during evaluation: bedside  Patient participation: complete - patient participated  Level of consciousness: sleepy but conscious  Pain score: 0  Pain management: adequate  Airway patency: patent  Cardiovascular status: acceptable  Respiratory status: acceptable  Hydration status: acceptable  Postoperative Nausea and Vomiting: none        There were no known notable events for this encounter.

## 2024-02-12 NOTE — ADDENDUM NOTE
Encounter addended by: Christiane Parnell RN on: 2/12/2024 12:31 PM   Actions taken: Contacts section saved, Flowsheet accepted

## 2024-02-15 ENCOUNTER — APPOINTMENT (OUTPATIENT)
Dept: PRIMARY CARE | Facility: CLINIC | Age: 44
End: 2024-02-15
Payer: COMMERCIAL

## 2024-02-15 ENCOUNTER — TELEPHONE (OUTPATIENT)
Dept: PRIMARY CARE | Facility: CLINIC | Age: 44
End: 2024-02-15

## 2024-02-15 PROBLEM — J02.9 ACUTE PHARYNGITIS: Status: ACTIVE | Noted: 2024-02-15

## 2024-02-15 PROBLEM — E66.9 OBESITY WITH BODY MASS INDEX 30 OR GREATER: Status: ACTIVE | Noted: 2024-02-15

## 2024-02-15 PROBLEM — R07.1 CHEST PAIN ON BREATHING: Status: ACTIVE | Noted: 2023-03-22

## 2024-02-15 PROBLEM — R41.3 MEMORY IMPAIRMENT: Status: ACTIVE | Noted: 2024-02-15

## 2024-02-15 PROBLEM — Z91.199 PATIENT'S NONCOMPLIANCE WITH OTHER MEDICAL TREATMENT AND REGIMEN DUE TO UNSPECIFIED REASON: Status: ACTIVE | Noted: 2023-03-23

## 2024-02-15 PROBLEM — R43.2 LOSS OF TASTE: Status: ACTIVE | Noted: 2024-02-15

## 2024-02-15 PROBLEM — R42 DIZZINESS AND GIDDINESS: Status: ACTIVE | Noted: 2018-12-12

## 2024-02-15 PROBLEM — I45.10 RIGHT BUNDLE BRANCH BLOCK: Status: ACTIVE | Noted: 2018-09-12

## 2024-02-15 PROBLEM — R01.1 CARDIAC MURMUR, UNSPECIFIED: Status: ACTIVE | Noted: 2018-09-12

## 2024-02-15 PROBLEM — Z20.822 CONTACT WITH AND (SUSPECTED) EXPOSURE TO COVID-19: Status: ACTIVE | Noted: 2022-12-17

## 2024-02-15 PROBLEM — Z86.79 HISTORY OF HYPERTENSION: Status: ACTIVE | Noted: 2024-02-15

## 2024-02-15 NOTE — TELEPHONE ENCOUNTER
Patient is concerned about labs from December as she states she read them and they did not look normal, please advise

## 2024-02-15 NOTE — TELEPHONE ENCOUNTER
The urine testing from Dec looks like there was some skin contamination.  The white blood cells are likely from inflammation and much less likely from a UTI given that there was no blood or nitrites present in the urine.  The inflammation could result from vaginal irritation or even yeast infections.    If she has urinary symptoms, testing can be repeated.

## 2024-02-18 DIAGNOSIS — F41.9 ANXIETY: ICD-10-CM

## 2024-02-20 PROCEDURE — RXMED WILLOW AMBULATORY MEDICATION CHARGE

## 2024-02-20 RX ORDER — LABETALOL 100 MG/1
100 TABLET, FILM COATED ORAL EVERY 12 HOURS
Qty: 90 TABLET | Refills: 0 | Status: SHIPPED | OUTPATIENT
Start: 2024-02-20 | End: 2024-05-14 | Stop reason: SDUPTHER

## 2024-02-21 ENCOUNTER — HOSPITAL ENCOUNTER (OUTPATIENT)
Dept: RADIOLOGY | Facility: HOSPITAL | Age: 44
End: 2024-02-21
Payer: COMMERCIAL

## 2024-02-21 ENCOUNTER — HOSPITAL ENCOUNTER (OUTPATIENT)
Dept: RADIOLOGY | Facility: HOSPITAL | Age: 44
Discharge: HOME | End: 2024-02-21
Payer: COMMERCIAL

## 2024-02-21 ENCOUNTER — APPOINTMENT (OUTPATIENT)
Dept: RADIOLOGY | Facility: HOSPITAL | Age: 44
End: 2024-02-21
Payer: COMMERCIAL

## 2024-02-21 DIAGNOSIS — R11.14 BILIOUS VOMITING WITH NAUSEA: ICD-10-CM

## 2024-02-21 PROCEDURE — 3430000001 HC RX 343 DIAGNOSTIC RADIOPHARMACEUTICALS: Performed by: INTERNAL MEDICINE

## 2024-02-21 PROCEDURE — 78264 GASTRIC EMPTYING IMG STUDY: CPT | Performed by: STUDENT IN AN ORGANIZED HEALTH CARE EDUCATION/TRAINING PROGRAM

## 2024-02-21 PROCEDURE — 78264 GASTRIC EMPTYING IMG STUDY: CPT

## 2024-02-21 RX ADMIN — Medication 1 MILLICURIE: at 08:10

## 2024-02-22 ENCOUNTER — OFFICE VISIT (OUTPATIENT)
Dept: PRIMARY CARE | Facility: CLINIC | Age: 44
End: 2024-02-22
Payer: COMMERCIAL

## 2024-02-22 VITALS
BODY MASS INDEX: 36.36 KG/M2 | HEART RATE: 98 BPM | HEIGHT: 62 IN | SYSTOLIC BLOOD PRESSURE: 134 MMHG | DIASTOLIC BLOOD PRESSURE: 83 MMHG | OXYGEN SATURATION: 98 % | WEIGHT: 197.6 LBS | TEMPERATURE: 98.3 F

## 2024-02-22 DIAGNOSIS — I10 ESSENTIAL HYPERTENSION: Primary | ICD-10-CM

## 2024-02-22 DIAGNOSIS — E87.6 HYPOKALEMIA: ICD-10-CM

## 2024-02-22 DIAGNOSIS — K21.9 GASTROESOPHAGEAL REFLUX DISEASE WITHOUT ESOPHAGITIS: ICD-10-CM

## 2024-02-22 DIAGNOSIS — R11.2 NAUSEA AND VOMITING, UNSPECIFIED VOMITING TYPE: ICD-10-CM

## 2024-02-22 DIAGNOSIS — F41.9 ANXIETY: ICD-10-CM

## 2024-02-22 DIAGNOSIS — Z12.31 ENCOUNTER FOR SCREENING MAMMOGRAM FOR MALIGNANT NEOPLASM OF BREAST: ICD-10-CM

## 2024-02-22 DIAGNOSIS — R14.0 BLOATING: ICD-10-CM

## 2024-02-22 DIAGNOSIS — K44.9 HIATAL HERNIA: ICD-10-CM

## 2024-02-22 PROBLEM — J02.9 ACUTE PHARYNGITIS: Status: RESOLVED | Noted: 2024-02-15 | Resolved: 2024-02-22

## 2024-02-22 PROCEDURE — 99213 OFFICE O/P EST LOW 20 MIN: CPT | Performed by: FAMILY MEDICINE

## 2024-02-22 PROCEDURE — 3079F DIAST BP 80-89 MM HG: CPT | Performed by: FAMILY MEDICINE

## 2024-02-22 PROCEDURE — 3008F BODY MASS INDEX DOCD: CPT | Performed by: FAMILY MEDICINE

## 2024-02-22 PROCEDURE — 1036F TOBACCO NON-USER: CPT | Performed by: FAMILY MEDICINE

## 2024-02-22 PROCEDURE — 3075F SYST BP GE 130 - 139MM HG: CPT | Performed by: FAMILY MEDICINE

## 2024-02-22 NOTE — PATIENT INSTRUCTIONS
Follow up with GI as scheduled.  Have testing completed and we have referred you to allergist for testing as well.      Follow up in 3 months.    It was a pleasure to see you today. Thank you for choosing us for your health care needs.    If you have lab or other testing completed and have not been informed of results within one week, please call the office for your results.    If you haven't done so, consider signing up for [facility], the [facility] personal health record. Ask the staff how you can get started.

## 2024-02-22 NOTE — PROGRESS NOTES
Subjective   Patient ID: Aster Pena is a 44 y.o. female who presents for Follow-up.    HPI       Labs: 02/2024  Colonoscopy: 2020    Complaining of feeling bloated with nausea and vomiting on a daily basis.  Changed her GI doctor to Dr. Valderrama.  Had EGD and gastric emptying study that were normal.  Protonix increased to twice a day.           Pt has HTN.  Patient does not monitor BP at home but has a machine that she can use if needed.  Denies CP, SOB, dizziness, and LE edema.   Patient is compliant with antihypertensive therapy and denies any noted side effects.     Patient has a hiatal hernia with GERD.  Her symptoms are stable on the pantoprazole.   Patient denies any breakthrough reflux symptoms.     She has hypokalemia.  Pt is maintained on supplemental potassium once a day.     Pt has anxiety / depression.   She was following with a grief group but no longer thought she needed to and symptoms have remained stable.  As previously noted, patient was seen and evaluated by psychiatry at [facility].  She is no longer following with psychiatry.        Review of Systems  Constitutional: Patient denies any fever, chills, loss of appetite, or unexplained weight loss.  Cardiovascular: Patient denies any chest pain, shortness of breath with exertion, tachycardia, palpitations, orthopnea, or paroxysmal nocturnal dyspnea.  Respiratory: Patient denies any cough, shortness breath, or wheezing.  Gastrointestinal: Patient denies any nausea, vomiting, diarrhea, constipation, melena, hematochezia, or reflux symptoms.  Skin: Denies any rashes or skin lesions.   Neurology: Patient denies any new motor or sensory losses.  Denies any numbness, tingling, weakness, and incoordination of the extremities.  Patient also denies any tremor, seizures, or gait instability.  Endocrinology: Denies any polyuria, polydipsia, polyphagia, or heat/cold intolerance.    Objective   /83   Pulse 98   Temp 36.8 °C (98.3 °F)   Ht 1.575 m  "(5' 2\")   Wt 89.6 kg (197 lb 9.6 oz)   SpO2 98%   BMI 36.14 kg/m²     Physical Exam  General Appearance: Alert and cooperative, in no acute distress, well-developed/well-nourished male.  Neck: Supple and without adenopathy or rigidity.  There is no JVD at 90° and no carotid bruits are noted.  There is no thyromegaly, thyroid tenderness, or palpable thyroid nodules.  Heart: Regular rate and rhythm without murmur or ectopy.  Respiratory: Lungs are clear to auscultation bilaterally with good air exchange.  Good respiratory effort and no accessory muscle use.  Skin: Good turgor, moist, warm and without rashes or lesions.  Neurological exam: Alert and oriented ×3, no tremor, normal gait.  Extremities: No clubbing, cyanosis, or edema    Assessment/Plan   1. Essential hypertension  Blood pressure appears adequately controlled and we will continue with the current antihypertensive therapy.    2. Hiatal hernia  She has had some persistent GI symptoms and is undergoing treatment with GI.  She will continue the twice daily dosing of Protonix and follow-up with her gastroenterologist as directed.    3. Gastroesophageal reflux disease without esophagitis  Stable.  Continue pantoprazole.    4. Hypokalemia  Stable based on labs.  Will continue to monitor.    5. Anxiety  Stable based on symptoms.  Will continue with the current treatment plan    6. Bloating  She has continued to have some GI symptoms despite an essentially normal EGD and gastric emptying study.  We will order a celiac panel and refer her to an allergist to evaluate for any underlying food allergies.  - Celiac Panel; Future  - Referral to Allergy; Future    7. Nausea and vomiting, unspecified vomiting type  She has continued to have some GI symptoms despite an essentially normal EGD and gastric emptying study.  We will order a celiac panel and refer her to an allergist to evaluate for any underlying food allergies.  - Celiac Panel; Future  - Referral to Allergy; " Future    8. Encounter for screening mammogram for malignant neoplasm of breast  Bilateral screening mammogram was ordered today.  - BI mammo bilateral screening tomosynthesis; Future         Orders Placed This Encounter   Procedures    BI mammo bilateral screening tomosynthesis    Celiac Panel    Referral to Allergy

## 2024-02-27 ENCOUNTER — PHARMACY VISIT (OUTPATIENT)
Dept: PHARMACY | Facility: CLINIC | Age: 44
End: 2024-02-27
Payer: COMMERCIAL

## 2024-03-18 ENCOUNTER — HOSPITAL ENCOUNTER (OUTPATIENT)
Dept: RADIOLOGY | Facility: HOSPITAL | Age: 44
Discharge: HOME | End: 2024-03-18
Payer: COMMERCIAL

## 2024-03-18 DIAGNOSIS — Z12.31 ENCOUNTER FOR SCREENING MAMMOGRAM FOR MALIGNANT NEOPLASM OF BREAST: ICD-10-CM

## 2024-03-18 PROCEDURE — 77067 SCR MAMMO BI INCL CAD: CPT

## 2024-03-18 PROCEDURE — 77063 BREAST TOMOSYNTHESIS BI: CPT | Performed by: RADIOLOGY

## 2024-03-18 PROCEDURE — 77067 SCR MAMMO BI INCL CAD: CPT | Performed by: RADIOLOGY

## 2024-04-09 ENCOUNTER — OFFICE VISIT (OUTPATIENT)
Dept: GASTROENTEROLOGY | Facility: CLINIC | Age: 44
End: 2024-04-09
Payer: COMMERCIAL

## 2024-04-09 VITALS
HEIGHT: 62 IN | DIASTOLIC BLOOD PRESSURE: 112 MMHG | BODY MASS INDEX: 36.8 KG/M2 | WEIGHT: 200 LBS | HEART RATE: 85 BPM | SYSTOLIC BLOOD PRESSURE: 156 MMHG

## 2024-04-09 DIAGNOSIS — R11.14 BILIOUS VOMITING WITH NAUSEA: ICD-10-CM

## 2024-04-09 PROCEDURE — 1036F TOBACCO NON-USER: CPT | Performed by: INTERNAL MEDICINE

## 2024-04-09 PROCEDURE — 3077F SYST BP >= 140 MM HG: CPT | Performed by: INTERNAL MEDICINE

## 2024-04-09 PROCEDURE — 3080F DIAST BP >= 90 MM HG: CPT | Performed by: INTERNAL MEDICINE

## 2024-04-09 PROCEDURE — 99213 OFFICE O/P EST LOW 20 MIN: CPT | Performed by: INTERNAL MEDICINE

## 2024-04-09 PROCEDURE — 3008F BODY MASS INDEX DOCD: CPT | Performed by: INTERNAL MEDICINE

## 2024-04-09 NOTE — PROGRESS NOTES
Gastroenterology Office Visit     History of Present Illness:  Since last OV in 2/24, patient has had EGD and GES; see below for complete results.  PPI increased to twice daily.   She is actually taking 80 mg b/w 8-11AM.  Has difficulty remembering to take pills.  Much improved.  Has had minimal b/t n/v on this regimen.        OV in 2/24:   Aster Pena is a 43 y.o. female who presents to GI clinic for follow up of n/v.  Since last OV in 4/23, patient has continued pantoprazole daily.        N/v intermittently for years, worse over last 6-8 months. Vomiting is mostly bilious, non-bloody.  Sometimes vomits food, but never from the day before. Associated with a 10 lbs wt loss over 6 months.  Also gets b/t HB 2-3x/ wk despite pantoprazole qam for several years.        Has seen Marina Orantes, Justyn, and Ruth Ann Agustin in the past.      Denies MJ use.  No prior GES.        OV with Justyn in 4/23:  Aster Pena is a 44yo female with a PMH of depression/anxiety, GERD, diverticulitis, HTN who presents to clinic for follow-up of multiple GI symptoms including GERD, chronic nausea, dyspepsia, and IBS-M, and rectal bleeding. + New oropharyngeal dysphagia. Patient states that most of her symptoms have improved/resolved. She does have a new symptom of oropharyngeal dysphagia. She says that it was happening sparingly, but now happens almost daily. She has no other concerns at this time. Occasional hemorrhoidal bleeding has significantly improved.     Review of Systems  Constitutional: denies fever/ chills, night sweats, +wt loss   Respiratory: denies SOB, MENDOZA  CV: denies chest pain and LE edema  Neuro: denies weakness and difficulty walking      Past Medical History   has a past medical history of Diaphragmatic hernia without obstruction or gangrene (12/22/2022), Hypokalemia (12/22/2022), Personal history of other diseases of the circulatory system (12/30/2020), Personal history of other diseases of the nervous  system and sense organs (12/30/2020), and Personal history of other specified conditions (11/11/2020).     Problem List  Patient Active Problem List   Diagnosis    Abnormal uterine bleeding (AUB)    Adjustment disorder    Anxiety    Bacterial vaginosis    Chronic constipation    Bloating    Diarrhea    Depression with anxiety    Diverticulitis, colon    Essential hypertension    Gastroesophageal reflux disease    Generalized abdominal pain    Hiatal hernia    Hypokalemia    IBS (irritable bowel syndrome)    Irregular uterine bleeding    Nausea and vomiting    Obstructive sleep apnea syndrome    Oral candidiasis    Rectal bleeding    Stage 2 chronic kidney disease    Urinary hesitancy    Urine leukocytes    Vaginal discharge    Vaginal itching    Class 2 severe obesity with serious comorbidity and body mass index (BMI) of 38.0 to 38.9 in adult (CMS/[facility])    Cough    Insomnia    Suspected COVID-19 virus infection    Constipation    Nausea in adult    Fatigue    Oropharyngeal dysphagia    Cardiac murmur, unspecified    Chest pain on breathing    Contact with and (suspected) exposure to covid-19    Dizziness and giddiness    Loss of taste    History of hypertension    Memory impairment    Patient's noncompliance with other medical treatment and regimen due to unspecified reason    Obesity with body mass index 30 or greater    Right bundle branch block           Past Surgical History  Past Surgical History:   Procedure Laterality Date    OTHER SURGICAL HISTORY  08/28/2019    Heart surgery    OTHER SURGICAL HISTORY  07/12/2022    Ovarian cystectomy    OTHER SURGICAL HISTORY  07/12/2022    Enterolysis    OTHER SURGICAL HISTORY  07/12/2022    Intrauterine device placement       Social History   reports that she quit smoking about 10 years ago. Her smoking use included cigarettes. She has a 20.00 pack-year smoking history. She has never used smokeless tobacco. She reports current alcohol use. She reports that she does not use  "drugs.     Family History  family history includes Brain cancer in her father; Hypertension in her sister.       Allergies  Allergies   Allergen Reactions    Acetaminophen-Codeine Hallucinations    Codeine Other     Tylenol with Codeine tablets causes hallucinations    Hydromorphone Other     \"Extreme vomiting\"    Paroxetine Hcl Nausea Only and Unknown     Nausea, vomiting, shakiness    Amoxicillin Rash       Medications  Current Outpatient Medications on File Prior to Visit   Medication Sig Dispense Refill    buPROPion XL (Wellbutrin XL) 300 mg 24 hr tablet TAKE 1 TABLET BY MOUTH EVERY MORNING 90 tablet 0    dicyclomine (Bentyl) 20 mg tablet Take 1 tablet (20 mg) by mouth every 6 hours if needed.      L. acidophilus/Bifid. animalis 32 billion cell capsule Take by mouth.      labetalol (Normodyne) 100 mg tablet Take 1 tablet (100 mg) by mouth every 12 hours. 90 tablet 0    levonorgestrel (Mirena) 20 mcg/24 hours (8 yrs) 52 mg IUD by intrauterine route.      ondansetron ODT (Zofran-ODT) 8 mg disintegrating tablet Take 1 mg by mouth every 12 (twelve) hours if needed.      pantoprazole (ProtoNix) 40 mg EC tablet Take 1 tablet (40 mg) by mouth 2 times a day before meals. 180 tablet 1    spironolactone (Aldactone) 25 mg tablet TAKE 1 TABLET BY MOUTH ONCE DAILY 90 tablet 0     No current facility-administered medications on file prior to visit.            Objective   BP (!) 156/112   Pulse 85   Ht 1.575 m (5' 2\")   Wt 90.7 kg (200 lb)   BMI 36.58 kg/m²           LABS  Component      Latest Ref Rng 2/2/2024   Creatinine      0.50 - 1.05 mg/dL 0.90    EGFR      >60 mL/min/1.73m*2 82    Calcium      8.6 - 10.3 mg/dL 8.7    Albumin      3.4 - 5.0 g/dL 4.0    Alkaline Phosphatase      33 - 110 U/L 66    Total Protein      6.4 - 8.2 g/dL 7.0    AST      9 - 39 U/L 14    Bilirubin Total      0.0 - 1.2 mg/dL 0.5    ALT      7 - 45 U/L 10      TSH normal 11/22      Radiology  CT A/P 12/22:  no PE, small HH, tics  GES 2/24: " normal, 6% retention at 4 hrs    Endoscopy    Colonoscopy 1/20 with Lamberto: sigmoid tics, 3-mm rectal HP polyp  EGD 1/20 with Lamberto for GERD: 2-cm HH, 3 possible tongues of Ornelas's (not confirmed on bx); S/D normal  EGD 2/24: normal D/S; 3-cm HH      Assessment/Plan   Aster Pena is a 43 y.o. female who presents to GI clinic for n/v.  Most likely due to GERD.  Improved significantly with pantoprazole 80 mg daily.     Nausea and vomiting- EGD in 2020 and 2/24, CT in 12/22 w/o SBO.  GES normal in 2/24. Denies MJ use.   Continue current PPI regimen, follow up prn.          Federico Valderrama MD

## 2024-04-17 ENCOUNTER — APPOINTMENT (OUTPATIENT)
Dept: OBSTETRICS AND GYNECOLOGY | Facility: CLINIC | Age: 44
End: 2024-04-17
Payer: COMMERCIAL

## 2024-04-19 ENCOUNTER — OFFICE VISIT (OUTPATIENT)
Dept: OBSTETRICS AND GYNECOLOGY | Facility: CLINIC | Age: 44
End: 2024-04-19
Payer: COMMERCIAL

## 2024-04-19 ENCOUNTER — PHARMACY VISIT (OUTPATIENT)
Dept: PHARMACY | Facility: CLINIC | Age: 44
End: 2024-04-19
Payer: COMMERCIAL

## 2024-04-19 VITALS
DIASTOLIC BLOOD PRESSURE: 100 MMHG | WEIGHT: 200.7 LBS | BODY MASS INDEX: 36.71 KG/M2 | SYSTOLIC BLOOD PRESSURE: 160 MMHG

## 2024-04-19 DIAGNOSIS — N89.8 VAGINAL DISCHARGE: Primary | ICD-10-CM

## 2024-04-19 DIAGNOSIS — Z20.2 EXPOSURE TO STD: ICD-10-CM

## 2024-04-19 PROCEDURE — 87661 TRICHOMONAS VAGINALIS AMPLIF: CPT

## 2024-04-19 PROCEDURE — 3080F DIAST BP >= 90 MM HG: CPT | Performed by: ADVANCED PRACTICE MIDWIFE

## 2024-04-19 PROCEDURE — 3008F BODY MASS INDEX DOCD: CPT | Performed by: ADVANCED PRACTICE MIDWIFE

## 2024-04-19 PROCEDURE — 99213 OFFICE O/P EST LOW 20 MIN: CPT | Performed by: ADVANCED PRACTICE MIDWIFE

## 2024-04-19 PROCEDURE — 87205 SMEAR GRAM STAIN: CPT

## 2024-04-19 PROCEDURE — RXMED WILLOW AMBULATORY MEDICATION CHARGE

## 2024-04-19 PROCEDURE — 87800 DETECT AGNT MULT DNA DIREC: CPT

## 2024-04-19 PROCEDURE — 3077F SYST BP >= 140 MM HG: CPT | Performed by: ADVANCED PRACTICE MIDWIFE

## 2024-04-19 RX ORDER — FLUCONAZOLE 150 MG/1
150 TABLET ORAL ONCE
Qty: 1 TABLET | Refills: 0 | Status: SHIPPED | OUTPATIENT
Start: 2024-04-19 | End: 2024-04-19

## 2024-04-19 RX ORDER — FLUCONAZOLE 150 MG/1
150 TABLET ORAL DAILY
Qty: 2 TABLET | Refills: 1 | Status: SHIPPED | OUTPATIENT
Start: 2024-04-19 | End: 2024-04-21

## 2024-04-19 ASSESSMENT — ENCOUNTER SYMPTOMS
FEVER: 0
ABDOMINAL PAIN: 0
NAUSEA: 0
HEADACHES: 1
ANOREXIA: 0
DIARRHEA: 0
DYSURIA: 0
VOMITING: 0
FREQUENCY: 1
BACK PAIN: 0
FLANK PAIN: 0
HEMATURIA: 0
CONSTIPATION: 1
CHILLS: 0
SORE THROAT: 0

## 2024-04-19 NOTE — PROGRESS NOTES
"GYNECOLOGY PROGRESS NOTE        CC:  see below. Discharge x 2weeks that is clear to white to yellow. No odor. C/O itching/irritation. Was sexually active with a new partner, had asked him to wear a condom but \"he didn't respect her wishes\" Patient would like all STI testing.   Chief Complaint   Patient presents with    Follow-up     Est pt visit.   States has vulva itching and a light to clear discharge for about 2 weeks.   No change of soaps or detergents.         HPI:  Aster Pena is her with a complaints of vaginal discharge x 2weeks with irritation.   She denies any new sexual partners, recent antibiotics, or new soaps or detergents.  Prior STDs none.      ROS:  GYN - see HPI        PHYSICAL EXAM:  BP (!) 160/100 (BP Location: Left arm, Patient Position: Sitting)   Wt 91 kg (200 lb 11.2 oz)   BMI 36.71 kg/m²   GEN:  A&O, NAD  GYN:  normal vulva and perineum w/o lesions or ulcers but slight redness noted,   PSYCH:  normal affect, non-anxious      IMPRESSION/PLAN:  A:  slight redness noted to labia. Possible STI exposure  Plan: 1. STI off the urine sent. 2. Vaginal cx sent.   Problem List Items Addressed This Visit    None     Vaginal discharge:  Examination consistent with yeast.  Rx for diflucan 150mg today and repeat Sunday provided, use/AE reviewed.  STD cultures done.  Vulvar hygiene measures and condom use for STD prevention reviewed.       Kallie Carlisle, VICENTE-ELISE  "

## 2024-04-20 LAB
C TRACH RRNA SPEC QL NAA+PROBE: NEGATIVE
CLUE CELLS VAG LPF-#/AREA: PRESENT /[LPF]
N GONORRHOEA DNA SPEC QL PROBE+SIG AMP: NEGATIVE
NUGENT SCORE: 8
T VAGINALIS RRNA SPEC QL NAA+PROBE: NEGATIVE
YEAST VAG WET PREP-#/AREA: ABNORMAL

## 2024-04-22 ENCOUNTER — PHARMACY VISIT (OUTPATIENT)
Dept: PHARMACY | Facility: CLINIC | Age: 44
End: 2024-04-22
Payer: COMMERCIAL

## 2024-04-22 DIAGNOSIS — A49.8 GARDNERELLA INFECTION: Primary | ICD-10-CM

## 2024-04-22 PROCEDURE — RXMED WILLOW AMBULATORY MEDICATION CHARGE

## 2024-04-22 RX ORDER — METRONIDAZOLE 500 MG/1
500 TABLET ORAL 2 TIMES DAILY
Qty: 14 TABLET | Refills: 0 | Status: SHIPPED | OUTPATIENT
Start: 2024-04-22 | End: 2024-04-29

## 2024-05-10 DIAGNOSIS — F41.9 ANXIETY: ICD-10-CM

## 2024-05-10 DIAGNOSIS — I10 ESSENTIAL HYPERTENSION: ICD-10-CM

## 2024-05-10 PROCEDURE — RXMED WILLOW AMBULATORY MEDICATION CHARGE

## 2024-05-13 RX ORDER — LABETALOL 100 MG/1
100 TABLET, FILM COATED ORAL EVERY 12 HOURS
Qty: 90 TABLET | Refills: 0 | OUTPATIENT
Start: 2024-05-13 | End: 2025-05-13

## 2024-05-13 RX ORDER — BUPROPION HYDROCHLORIDE 300 MG/1
300 TABLET ORAL
Qty: 90 TABLET | Refills: 0 | OUTPATIENT
Start: 2024-05-13 | End: 2025-05-13

## 2024-05-13 RX ORDER — SPIRONOLACTONE 25 MG/1
25 TABLET ORAL DAILY
Qty: 90 TABLET | Refills: 0 | OUTPATIENT
Start: 2024-05-13 | End: 2025-05-13

## 2024-05-13 NOTE — TELEPHONE ENCOUNTER
Medication refused due to failing protocol.    Requested Prescriptions   Pending Prescriptions Disp Refills    buPROPion XL (Wellbutrin XL) 300 mg 24 hr tablet 90 tablet 0     Sig: TAKE 1 TABLET BY MOUTH EVERY MORNING       Atypical Antidepressants Protocol Passed - 5/10/2024  2:49 PM        Passed - Visit with relevant provider in past 9 months or upcoming 90 days     Recent Visits  Date Type Provider Dept   02/22/24 Office Visit Addison Stack, DO Do [id number]   10/12/23 Office Visit Addison Stack DO Do [id number]   Showing recent visits within past 270 days and meeting all other requirements  Future Appointments  Date Type Provider Dept   06/06/24 Appointment Addison Stack DO Do [id number] [facility]   Showing future appointments within next 90 days and meeting all other requirements            Passed - No active pregnancy on record        Passed - No pregnancy test in the past 12 months or most recent test was negative        Passed - Medication not refilled in past 45 days (1.5 months)     No matching medication orders between 3/29/2024 12:16 PM and 5/13/2024 12:16 PM              spironolactone (Aldactone) 25 mg tablet 90 tablet 0     Sig: TAKE 1 TABLET BY MOUTH ONCE DAILY       Diuretics Protocol Passed - 5/10/2024  2:49 PM        Passed - No active pregnancy on record        Passed - Normal serum potassium in past 12 months     Potassium   Date Value Ref Range Status   02/02/2024 3.7 3.5 - 5.3 mmol/L Final             Passed - Normal serum sodium in past 12 months     Sodium   Date Value Ref Range Status   02/02/2024 138 136 - 145 mmol/L Final             Passed - No pregnancy test in the past 12 months or most recent test was negative        Passed - Visit with relevant provider in past 12 months or upcoming 90 days     Recent Visits  Date Type Provider Dept   02/22/24 Office Visit Addison Stack, DO  [id number]   10/12/23 Office Visit Addison Stack DO Do [id number]    07/12/23 Office Visit Addison Stack, DO Do [id number]   06/07/23 Treatment Addison Stack, DO Do [id number]   Showing recent visits within past 365 days and meeting all other requirements  Future Appointments  Date Type Provider Dept   06/06/24 Appointment Addison Stack, DO Do [id number] [facility]   Showing future appointments within next 90 days and meeting all other requirements              Passed - Medication not refilled in past 45 days (1.5 months)     No matching medication orders between 3/29/2024 12:16 PM and 5/13/2024 12:16 PM            Passed - Normal serum creatinine in past 12 months     Creatinine   Date Value Ref Range Status   02/02/2024 0.90 0.50 - 1.05 mg/dL Final             Passed - Weight on record in past 6 months        Passed - Blood pressure on record     BP Readings from Last 3 Encounters:   04/19/24 (!) 160/100   04/09/24 (!) 156/112   02/22/24 134/83                 labetalol (Normodyne) 100 mg tablet 90 tablet 0     Sig: Take 1 tablet (100 mg) by mouth every 12 hours.       Beta-Blockers Protocol Failed - 5/10/2024  2:49 PM        Failed - BP under 140/90 in past year or if patient has diabetes, CAD, or PVD, BP under 130/80 in past year     BP Readings from Last 3 Encounters:   04/19/24 (!) 160/100   04/09/24 (!) 156/112   02/22/24 134/83               Passed - Normal serum creatinine in past 9 months     Creatinine   Date Value Ref Range Status   02/02/2024 0.90 0.50 - 1.05 mg/dL Final             Passed - Normal serum potassium in past 9 months     Potassium   Date Value Ref Range Status   02/02/2024 3.7 3.5 - 5.3 mmol/L Final             Passed - No active pregnancy on record        Passed - No pregnancy test in the past 12 months or most recent test was negative        Passed - Visit with relevant provider in past 12 months or upcoming 90 days     Recent Visits  Date Type Provider Dept   02/22/24 Office Visit Addison Stack DO Do [id number]   10/12/23 Office  Visit Addison Stack, DO Do [id number]   07/12/23 Office Visit Addison Stack DO Do [id number]   06/07/23 Treatment Addison Stack, DO Do [id number]   Showing recent visits within past 365 days and meeting all other requirements  Future Appointments  Date Type Provider Dept   06/06/24 Appointment Addison Stack DO Do [id number] [facility]   Showing future appointments within next 90 days and meeting all other requirements              Passed - Medication not refilled in past 45 days (1.5 months)     No matching medication orders between 3/29/2024 12:16 PM and 5/13/2024 12:16 PM            Passed - Heart on record in the past 6 months

## 2024-05-14 ENCOUNTER — PATIENT MESSAGE (OUTPATIENT)
Dept: PRIMARY CARE | Facility: CLINIC | Age: 44
End: 2024-05-14
Payer: COMMERCIAL

## 2024-05-14 ENCOUNTER — PHARMACY VISIT (OUTPATIENT)
Dept: PHARMACY | Facility: CLINIC | Age: 44
End: 2024-05-14
Payer: COMMERCIAL

## 2024-05-14 ENCOUNTER — TELEPHONE (OUTPATIENT)
Dept: PRIMARY CARE | Facility: CLINIC | Age: 44
End: 2024-05-14
Payer: COMMERCIAL

## 2024-05-14 DIAGNOSIS — I10 ESSENTIAL HYPERTENSION: ICD-10-CM

## 2024-05-14 DIAGNOSIS — F41.9 ANXIETY: ICD-10-CM

## 2024-05-14 PROCEDURE — RXMED WILLOW AMBULATORY MEDICATION CHARGE

## 2024-05-14 NOTE — TELEPHONE ENCOUNTER
Patient phones the office today regarding the following medications which were denied by the pharmacy. Patient would like to have a return call at [phone] to see if they can be refilled.     *Bupropion XL (Wellbutrin XL) 300mg 1 TAB QAM     *Spironolactone (Aldactone) 25mg 1 TAB every day     *Labetalol (Normodyne) 100mg 1 TAB Q12hrs     Thank you.

## 2024-05-15 PROCEDURE — RXMED WILLOW AMBULATORY MEDICATION CHARGE

## 2024-05-15 RX ORDER — SPIRONOLACTONE 25 MG/1
25 TABLET ORAL DAILY
Qty: 90 TABLET | Refills: 0 | Status: SHIPPED | OUTPATIENT
Start: 2024-05-15 | End: 2025-05-15

## 2024-05-15 RX ORDER — LABETALOL 100 MG/1
TABLET, FILM COATED ORAL
Qty: 90 TABLET | Refills: 0 | Status: SHIPPED | OUTPATIENT
Start: 2024-05-15 | End: 2024-06-06 | Stop reason: SDUPTHER

## 2024-05-15 RX ORDER — BUPROPION HYDROCHLORIDE 300 MG/1
300 TABLET ORAL
Qty: 90 TABLET | Refills: 0 | Status: SHIPPED | OUTPATIENT
Start: 2024-05-15 | End: 2024-06-06 | Stop reason: SDUPTHER

## 2024-05-15 RX ORDER — LABETALOL 100 MG/1
100 TABLET, FILM COATED ORAL EVERY 12 HOURS
Qty: 90 TABLET | Refills: 0 | Status: SHIPPED | OUTPATIENT
Start: 2024-05-15 | End: 2025-05-15

## 2024-05-15 RX ORDER — SPIRONOLACTONE 25 MG/1
25 TABLET ORAL DAILY
Qty: 90 TABLET | Refills: 0 | Status: SHIPPED | OUTPATIENT
Start: 2024-05-15 | End: 2024-06-06 | Stop reason: SDUPTHER

## 2024-05-15 RX ORDER — BUPROPION HYDROCHLORIDE 300 MG/1
300 TABLET ORAL
Qty: 90 TABLET | Refills: 0 | Status: SHIPPED | OUTPATIENT
Start: 2024-05-15 | End: 2025-05-15

## 2024-05-17 ENCOUNTER — PHARMACY VISIT (OUTPATIENT)
Dept: PHARMACY | Facility: CLINIC | Age: 44
End: 2024-05-17
Payer: COMMERCIAL

## 2024-05-17 DIAGNOSIS — A49.8 GARDNERELLA INFECTION: Primary | ICD-10-CM

## 2024-05-17 PROCEDURE — RXMED WILLOW AMBULATORY MEDICATION CHARGE

## 2024-05-17 RX ORDER — METRONIDAZOLE 500 MG/1
500 TABLET ORAL 2 TIMES DAILY
Qty: 14 TABLET | Refills: 0 | Status: SHIPPED | OUTPATIENT
Start: 2024-05-17 | End: 2024-05-24

## 2024-06-06 ENCOUNTER — OFFICE VISIT (OUTPATIENT)
Dept: PRIMARY CARE | Facility: CLINIC | Age: 44
End: 2024-06-06
Payer: COMMERCIAL

## 2024-06-06 VITALS
HEART RATE: 92 BPM | OXYGEN SATURATION: 94 % | TEMPERATURE: 97.9 F | BODY MASS INDEX: 37.47 KG/M2 | SYSTOLIC BLOOD PRESSURE: 126 MMHG | WEIGHT: 203.6 LBS | DIASTOLIC BLOOD PRESSURE: 87 MMHG | HEIGHT: 62 IN

## 2024-06-06 DIAGNOSIS — I10 ESSENTIAL HYPERTENSION: Primary | ICD-10-CM

## 2024-06-06 DIAGNOSIS — K44.9 HIATAL HERNIA: ICD-10-CM

## 2024-06-06 DIAGNOSIS — F41.9 ANXIETY: ICD-10-CM

## 2024-06-06 DIAGNOSIS — E87.6 HYPOKALEMIA: ICD-10-CM

## 2024-06-06 DIAGNOSIS — K21.9 GASTROESOPHAGEAL REFLUX DISEASE WITHOUT ESOPHAGITIS: ICD-10-CM

## 2024-06-06 PROCEDURE — 3079F DIAST BP 80-89 MM HG: CPT | Performed by: FAMILY MEDICINE

## 2024-06-06 PROCEDURE — 99214 OFFICE O/P EST MOD 30 MIN: CPT | Performed by: FAMILY MEDICINE

## 2024-06-06 PROCEDURE — 3074F SYST BP LT 130 MM HG: CPT | Performed by: FAMILY MEDICINE

## 2024-06-06 PROCEDURE — 3008F BODY MASS INDEX DOCD: CPT | Performed by: FAMILY MEDICINE

## 2024-06-06 ASSESSMENT — PATIENT HEALTH QUESTIONNAIRE - PHQ9
SUM OF ALL RESPONSES TO PHQ9 QUESTIONS 1 AND 2: 0
1. LITTLE INTEREST OR PLEASURE IN DOING THINGS: NOT AT ALL
2. FEELING DOWN, DEPRESSED OR HOPELESS: NOT AT ALL

## 2024-06-06 NOTE — PATIENT INSTRUCTIONS
Follow up in 3 months with labs to be done PRIOR.    It was a pleasure to see you today. Thank you for choosing us for your health care needs.    If you have lab or other testing completed and have not been informed of results within one week, please call the office for your results.    If you haven't done so, consider signing up for [facility] [employer], the [facility] personal health record. Ask the staff how you can get started.

## 2024-06-06 NOTE — PROGRESS NOTES
Subjective   Patient ID: Aster Pena is a 44 y.o. female who presents for Follow-up.    HPI     Now on Protonix twice daily at direction of her GI specialist.  Seems to be working well.    No new concerns   No recent BW   Mammo: 2024  Colonoscopy: 2020     Pt has HTN.  Patient does not monitor BP at home but has a machine that she can use if needed.  Denies CP, SOB, dizziness, and LE edema.   Patient is compliant with antihypertensive therapy and denies any noted side effects.     Patient has a hiatal hernia with GERD.  Her symptoms are stable on the pantoprazole.   Patient denies any breakthrough reflux symptoms.     She has hypokalemia.  Pt is maintained on supplemental potassium once a day.     Pt has anxiety / depression.   She was following with a grief group but no longer thought she needed to and symptoms have remained stable.  As previously noted, patient was seen and evaluated by psychiatry at [facility].  She is no longer following with psychiatry.    Review of Systems  Constitutional: Patient denies any fever, chills, loss of appetite, or unexplained weight loss.  Cardiovascular: Patient denies any chest pain, shortness of breath with exertion, tachycardia, palpitations, orthopnea, or paroxysmal nocturnal dyspnea.  Respiratory: Patient denies any cough, shortness breath, or wheezing.  Gastrointestinal: Patient denies any nausea, vomiting, diarrhea, constipation, melena, hematochezia, or reflux symptoms  Skin: Denies any rashes or skin lesions  Neurology: Patient denies any new motor or sensory losses. Denies any numbness, tingling, weakness, and incoordination of the extremities. Patient also denies any tremor, seizures, or gait instability.  Endocrinology: Denies any polyuria, polydipsia, polyphagia, or heat/cold intolerance.  Psychiatric: He denies any depression, or suicidal/homicidal ideation. Anxiety symptoms have been stable with the current medication.      Objective   /87   Pulse 92    "Temp 36.6 °C (97.9 °F)   Ht 1.575 m (5' 2\")   Wt 92.4 kg (203 lb 9.6 oz)   SpO2 94%   BMI 37.24 kg/m²     Physical Exam  General Appearance: Alert and cooperative, in no acute distress, well-developed/well-nourished, obese female.     Neck: Supple and without adenopathy or rigidity. There is no JVD at 90° and no carotid bruits are noted. There is no thyromegaly, thyroid tenderness, or palpable thyroid nodules.  Heart: Regular rate and rhythm without murmur or ectopy.  Lungs: Clear to auscultation bilaterally with good air exchange.  Skin: Good turgor, moist, warm and without rashes or lesions.  Neurological exam: Alert and oriented ×3, no tremor, normal gait.  Extremities: No clubbing, cyanosis, or edema  Psychiatric: Appropriate mood and affect, good insight and judgment, no delusions or thought disorders, no suicidal or homicidal ideation      Assessment/Plan   HTN:  Blood pressure appears adequately controlled and we will continue with the current antihypertensive therapy.     Hiatal hernia  She has had some persistent GI symptoms and is undergoing treatment with GI.  She will continue the twice daily dosing of Protonix and follow-up with her gastroenterologist as directed.     Gastroesophageal reflux disease without esophagitis  Stable.  Continue pantoprazole at the current dose.     Hypokalemia  Stable based on labs.  Will continue to monitor.     Anxiety  Stable based on symptoms.  Will continue with the current treatment plan     Follow up in 3 months.      Scribe Attestation  By signing my name below, IClaudio Scribe   attest that this documentation has been prepared under the direction and in the presence of Addison Stack DO.    Orders Placed This Encounter   Procedures    Comprehensive Metabolic Panel    Lipid Panel       Continue the current medications:  Current Outpatient Medications   Medication Instructions    buPROPion XL (Wellbutrin XL) 300 mg 24 hr tablet Take 1 tablet (300 mg) by " mouth once daily in the morning.    L. acidophilus/Bifid. animalis 32 billion cell capsule oral    labetalol (NORMODYNE) 100 mg, oral, Every 12 hours    levonorgestrel (Mirena) 20 mcg/24 hours (8 yrs) 52 mg IUD intrauterine    ondansetron ODT (ZOFRAN-ODT) 1 mg, oral, Every 12 hours PRN    pantoprazole (PROTONIX) 40 mg, oral, 2 times daily before meals    spironolactone (Aldactone) 25 mg tablet TAKE 1 TABLET BY MOUTH ONCE DAILY

## 2024-07-08 ENCOUNTER — APPOINTMENT (OUTPATIENT)
Dept: OBSTETRICS AND GYNECOLOGY | Facility: CLINIC | Age: 44
End: 2024-07-08
Payer: COMMERCIAL

## 2024-07-08 VITALS
HEIGHT: 62 IN | WEIGHT: 199.13 LBS | SYSTOLIC BLOOD PRESSURE: 180 MMHG | HEART RATE: 108 BPM | BODY MASS INDEX: 36.64 KG/M2 | DIASTOLIC BLOOD PRESSURE: 112 MMHG | OXYGEN SATURATION: 94 %

## 2024-07-08 DIAGNOSIS — N76.0 ACUTE VAGINITIS: Primary | ICD-10-CM

## 2024-07-08 DIAGNOSIS — B96.89 BACTERIAL VAGINOSIS: ICD-10-CM

## 2024-07-08 DIAGNOSIS — N76.0 BACTERIAL VAGINOSIS: ICD-10-CM

## 2024-07-08 PROCEDURE — 87591 N.GONORRHOEAE DNA AMP PROB: CPT

## 2024-07-08 PROCEDURE — 3080F DIAST BP >= 90 MM HG: CPT | Performed by: ADVANCED PRACTICE MIDWIFE

## 2024-07-08 PROCEDURE — 87205 SMEAR GRAM STAIN: CPT

## 2024-07-08 PROCEDURE — 3077F SYST BP >= 140 MM HG: CPT | Performed by: ADVANCED PRACTICE MIDWIFE

## 2024-07-08 PROCEDURE — 87491 CHLMYD TRACH DNA AMP PROBE: CPT

## 2024-07-08 PROCEDURE — 1036F TOBACCO NON-USER: CPT | Performed by: ADVANCED PRACTICE MIDWIFE

## 2024-07-08 PROCEDURE — 99213 OFFICE O/P EST LOW 20 MIN: CPT | Performed by: ADVANCED PRACTICE MIDWIFE

## 2024-07-08 PROCEDURE — 3008F BODY MASS INDEX DOCD: CPT | Performed by: ADVANCED PRACTICE MIDWIFE

## 2024-07-08 ASSESSMENT — ENCOUNTER SYMPTOMS
FLANK PAIN: 0
NAUSEA: 0
HEADACHES: 0
DYSURIA: 0
ABDOMINAL PAIN: 0
FEVER: 0
BACK PAIN: 0
CHILLS: 0
CONSTIPATION: 0
DIARRHEA: 0
HEMATURIA: 0
SORE THROAT: 0
VOMITING: 0
ANOREXIA: 0
FREQUENCY: 1

## 2024-07-08 NOTE — PROGRESS NOTES
"Assessment/Plan   Aster was seen today for vaginitis/bacterial vaginosis, vaginal itching and vaginal discharge.  Diagnoses and all orders for this visit:  Acute vaginitis  -     C. Trachomatis / N. Gonorrhoeae, Amplified Detection  Bacterial vaginosis  -     Vaginitis Gram Stain For Bacterial Vaginosis + Yeast    Patient in a phase of recurrent BV, has had this in past.  Await results, if positive plan treatment followed by 30 days boric acid and re test  Consider m. Genitalium testing as indicated    Follow up 5 weeks    Subjective   Aster Pena is a 44 y.o. female who presents for itching, discomfort, dryness, clear to light yellow vaginal discharge. Denies odor.  Onset: 6-8weeks ago back and forth. Has done 2 flagyl treatments without full resolution of symptoms.  Home therapies: N/A  Hx recurrent BV several years ago. Pt uses unscented Dove bar.    Menstrual History:  OB History          0    Para   0    Term   0       0    AB   0    Living   0         SAB   0    IAB   0    Ectopic   0    Multiple   0    Live Births   0                Menarche age: 13  LMP unknown, IUD          ROS as in HPI    Objective   BP (!) 180/112 (BP Location: Right arm, Patient Position: Sitting)   Pulse 108   Ht 1.575 m (5' 2\")   Wt 90.3 kg (199 lb 2 oz)   SpO2 94%   BMI 36.42 kg/m²     Physical Exam  Constitutional:       General: She is not in acute distress.  Pulmonary:      Effort: Pulmonary effort is normal.   Genitourinary:     General: Normal vulva.      Vagina: Vaginal discharge (scant white) present.      Cervix: Normal.      Uterus: Normal.       Adnexa: Right adnexa normal and left adnexa normal.      Comments: IUD strings visualized in cervix os just reaching external os, device neither visible nor palpable    Skin:     General: Skin is warm and dry.      Findings: No lesion.   Neurological:      Mental Status: She is alert.           "

## 2024-07-09 LAB
C TRACH RRNA SPEC QL NAA+PROBE: NEGATIVE
N GONORRHOEA DNA SPEC QL PROBE+SIG AMP: NEGATIVE

## 2024-07-10 LAB
CLUE CELLS VAG LPF-#/AREA: ABNORMAL /[LPF]
NUGENT SCORE: 2
YEAST VAG WET PREP-#/AREA: PRESENT

## 2024-07-11 ENCOUNTER — PHARMACY VISIT (OUTPATIENT)
Dept: PHARMACY | Facility: CLINIC | Age: 44
End: 2024-07-11
Payer: COMMERCIAL

## 2024-07-11 DIAGNOSIS — N89.8 VAGINAL DISCHARGE: Primary | ICD-10-CM

## 2024-07-11 DIAGNOSIS — B37.31 VAGINAL CANDIDA: Primary | ICD-10-CM

## 2024-07-11 PROCEDURE — RXMED WILLOW AMBULATORY MEDICATION CHARGE

## 2024-07-11 RX ORDER — TERCONAZOLE 8 MG/G
3 CREAM VAGINAL NIGHTLY
Qty: 20 G | Refills: 0 | Status: SHIPPED | OUTPATIENT
Start: 2024-07-11 | End: 2024-07-14

## 2024-07-11 RX ORDER — FLUCONAZOLE 150 MG/1
150 TABLET ORAL ONCE
Qty: 2 TABLET | Refills: 1 | Status: SHIPPED | OUTPATIENT
Start: 2024-07-11 | End: 2024-07-13

## 2024-07-11 NOTE — TELEPHONE ENCOUNTER
Pt called and stated that she had a postive for a yeast infection and they sent her a cream. She seen radha. She was seeing linda melton or shi and wants a pill form and not a vaginal cream due to going camping.

## 2024-07-12 DIAGNOSIS — B37.31 VAGINAL YEAST INFECTION: Primary | ICD-10-CM

## 2024-07-12 RX ORDER — FLUCONAZOLE 150 MG/1
150 TABLET ORAL ONCE
Qty: 2 TABLET | Refills: 0 | Status: SHIPPED | OUTPATIENT
Start: 2024-07-12 | End: 2024-07-12

## 2024-09-06 DIAGNOSIS — I10 ESSENTIAL HYPERTENSION: ICD-10-CM

## 2024-09-06 DIAGNOSIS — K21.9 GASTROESOPHAGEAL REFLUX DISEASE WITHOUT ESOPHAGITIS: ICD-10-CM

## 2024-09-06 DIAGNOSIS — F41.9 ANXIETY: ICD-10-CM

## 2024-09-09 PROCEDURE — RXMED WILLOW AMBULATORY MEDICATION CHARGE

## 2024-09-09 RX ORDER — LABETALOL 100 MG/1
100 TABLET, FILM COATED ORAL EVERY 12 HOURS
Qty: 90 TABLET | Refills: 0 | OUTPATIENT
Start: 2024-09-09 | End: 2025-09-09

## 2024-09-09 RX ORDER — BUPROPION HYDROCHLORIDE 300 MG/1
300 TABLET ORAL
Qty: 90 TABLET | Refills: 0 | OUTPATIENT
Start: 2024-09-09 | End: 2025-09-09

## 2024-09-09 RX ORDER — SPIRONOLACTONE 25 MG/1
25 TABLET ORAL DAILY
Qty: 90 TABLET | Refills: 0 | OUTPATIENT
Start: 2024-09-09 | End: 2025-09-09

## 2024-09-09 RX ORDER — PANTOPRAZOLE SODIUM 40 MG/1
40 TABLET, DELAYED RELEASE ORAL
Qty: 180 TABLET | Refills: 3 | Status: SHIPPED | OUTPATIENT
Start: 2024-09-09 | End: 2025-09-09

## 2024-09-09 NOTE — TELEPHONE ENCOUNTER
Medication refused due to failing protocol.    Requested Prescriptions   Pending Prescriptions Disp Refills    labetalol (Normodyne) 100 mg tablet 90 tablet 0     Sig: Take 1 tablet (100 mg) by mouth every 12 hours.       Beta-Blockers Protocol Failed - 9/6/2024  1:51 PM        Failed - BP under 140/90 in past year or if patient has diabetes, CAD, or PVD, BP under 130/80 in past year     BP Readings from Last 3 Encounters:   07/08/24 (!) 180/112   06/06/24 126/87   04/19/24 (!) 160/100               Passed - Normal serum creatinine in past 9 months     Creatinine   Date Value Ref Range Status   02/02/2024 0.90 0.50 - 1.05 mg/dL Final             Passed - Normal serum potassium in past 9 months     Potassium   Date Value Ref Range Status   02/02/2024 3.7 3.5 - 5.3 mmol/L Final             Passed - No active pregnancy on record        Passed - No pregnancy test in the past 12 months or most recent test was negative        Passed - Visit with relevant provider in past 12 months or upcoming 90 days     Recent Visits  Date Type Provider Dept   06/06/24 Office Visit Addison Stack, DO Do [id number] [facility]   02/22/24 Office Visit Addison Stack DO Do [id number]   10/12/23 Office Visit Addison Stack,  Do [id number]   Showing recent visits within past 365 days and meeting all other requirements  Future Appointments  Date Type Provider Dept   09/11/24 Appointment Addison Stack DO Do [id number] [facility]   Showing future appointments within next 90 days and meeting all other requirements              Passed - Medication not refilled in past 45 days (1.5 months)     No matching medication orders between 7/26/2024 12:19 PM and 9/9/2024 12:19 PM            Passed - Heart on record in the past 6 months          spironolactone (Aldactone) 25 mg tablet 90 tablet 0     Sig: TAKE 1 TABLET BY MOUTH ONCE DAILY       Diuretics Protocol Passed - 9/6/2024  1:51 PM        Passed - No active pregnancy on record        Passed -  Normal serum potassium in past 12 months     Potassium   Date Value Ref Range Status   02/02/2024 3.7 3.5 - 5.3 mmol/L Final             Passed - Normal serum sodium in past 12 months     Sodium   Date Value Ref Range Status   02/02/2024 138 136 - 145 mmol/L Final             Passed - No pregnancy test in the past 12 months or most recent test was negative        Passed - Visit with relevant provider in past 12 months or upcoming 90 days     Recent Visits  Date Type Provider Dept   06/06/24 Office Visit Addison Stack, DO Do [id number] [facility]   02/22/24 Office Visit Addison Stack, DO Do [id number]   10/12/23 Office Visit Addison Stack DO Do [id number]   Showing recent visits within past 365 days and meeting all other requirements  Future Appointments  Date Type Provider Dept   09/11/24 Appointment Addison Stack DO Do [id number] [facility]   Showing future appointments within next 90 days and meeting all other requirements              Passed - Medication not refilled in past 45 days (1.5 months)     No matching medication orders between 7/26/2024 12:19 PM and 9/9/2024 12:19 PM            Passed - Normal serum creatinine in past 12 months     Creatinine   Date Value Ref Range Status   02/02/2024 0.90 0.50 - 1.05 mg/dL Final             Passed - Weight on record in past 6 months        Passed - Blood pressure on record     BP Readings from Last 3 Encounters:   07/08/24 (!) 180/112   06/06/24 126/87   04/19/24 (!) 160/100                 buPROPion XL (Wellbutrin XL) 300 mg 24 hr tablet 90 tablet 0     Sig: Take 1 tablet (300 mg) by mouth once daily in the morning.       Atypical Antidepressants Protocol Passed - 9/6/2024  1:51 PM        Passed - Visit with relevant provider in past 9 months or upcoming 90 days     Recent Visits  Date Type Provider Dept   06/06/24 Office Visit Addison Stack DO Do [id number] [facility]   02/22/24 Office Visit Addison Stack DO Do [id number]   Showing recent visits  within past 270 days and meeting all other requirements  Future Appointments  Date Type Provider Dept   09/11/24 Appointment Addison Stack,  Do [id number] [facility]   Showing future appointments within next 90 days and meeting all other requirements            Passed - No active pregnancy on record        Passed - No pregnancy test in the past 12 months or most recent test was negative        Passed - Medication not refilled in past 45 days (1.5 months)     No matching medication orders between 7/26/2024 12:19 PM and 9/9/2024 12:19 PM

## 2024-09-11 ENCOUNTER — APPOINTMENT (OUTPATIENT)
Dept: PRIMARY CARE | Facility: CLINIC | Age: 44
End: 2024-09-11
Payer: COMMERCIAL

## 2024-09-11 VITALS
OXYGEN SATURATION: 96 % | BODY MASS INDEX: 36.22 KG/M2 | TEMPERATURE: 98.1 F | SYSTOLIC BLOOD PRESSURE: 128 MMHG | HEIGHT: 62 IN | DIASTOLIC BLOOD PRESSURE: 84 MMHG | WEIGHT: 196.8 LBS | HEART RATE: 88 BPM

## 2024-09-11 DIAGNOSIS — K44.9 HIATAL HERNIA: ICD-10-CM

## 2024-09-11 DIAGNOSIS — I10 ESSENTIAL HYPERTENSION: Primary | ICD-10-CM

## 2024-09-11 DIAGNOSIS — E87.6 HYPOKALEMIA: ICD-10-CM

## 2024-09-11 DIAGNOSIS — F41.9 ANXIETY: ICD-10-CM

## 2024-09-11 DIAGNOSIS — K21.9 GASTROESOPHAGEAL REFLUX DISEASE WITHOUT ESOPHAGITIS: ICD-10-CM

## 2024-09-11 PROCEDURE — 99214 OFFICE O/P EST MOD 30 MIN: CPT | Performed by: FAMILY MEDICINE

## 2024-09-11 PROCEDURE — 3008F BODY MASS INDEX DOCD: CPT | Performed by: FAMILY MEDICINE

## 2024-09-11 PROCEDURE — 3074F SYST BP LT 130 MM HG: CPT | Performed by: FAMILY MEDICINE

## 2024-09-11 PROCEDURE — 3079F DIAST BP 80-89 MM HG: CPT | Performed by: FAMILY MEDICINE

## 2024-09-11 PROCEDURE — RXMED WILLOW AMBULATORY MEDICATION CHARGE

## 2024-09-11 RX ORDER — BUPROPION HYDROCHLORIDE 300 MG/1
300 TABLET ORAL
Qty: 100 TABLET | Refills: 0 | Status: SHIPPED | OUTPATIENT
Start: 2024-09-11

## 2024-09-11 RX ORDER — LABETALOL 100 MG/1
100 TABLET, FILM COATED ORAL EVERY 12 HOURS
Qty: 100 TABLET | Refills: 0 | Status: SHIPPED | OUTPATIENT
Start: 2024-09-11

## 2024-09-11 RX ORDER — SPIRONOLACTONE 25 MG/1
25 TABLET ORAL DAILY
Qty: 100 TABLET | Refills: 0 | Status: SHIPPED | OUTPATIENT
Start: 2024-09-11

## 2024-09-11 NOTE — PROGRESS NOTES
Subjective   Patient ID: Aster Pena is a 44 y.o. female who presents for Follow-up.    HPI    She confirms the medication change to her pantoprazole has helped immensely.    LABS ORDERED TO BE DONE PRIOR TO TODAY'S VISIT WERE NOT YET COMPLETED.  Mammogram: 3/2024  Colonoscopy: 1/2020    No other concerns at this time     Pt has HTN.  Patient does not monitor BP at home but has a machine that she can use if needed.  Denies CP, SOB, dizziness, and LE edema.   Patient is compliant with antihypertensive therapy and denies any noted side effects.     Patient has a hiatal hernia with GERD.  Her symptoms are stable on the pantoprazole.   Patient denies any breakthrough reflux symptoms.     She has hypokalemia.  Pt is maintained on supplemental potassium once a day.     Pt has anxiety / depression.   She was following with a grief group but no longer thought she needed to and symptoms have remained stable.  As previously noted, patient was seen and evaluated by psychiatry at [facility].  She is no longer following with psychiatry.    Review of Systems  Cardiovascular: Patient denies any chest pain, shortness of breath with exertion, tachycardia, palpitations, orthopnea, or paroxysmal nocturnal dyspnea.  Respiratory: Patient denies any cough, shortness breath, or wheezing.  Gastrointestinal: Patient denies any nausea, vomiting, diarrhea, constipation, melena, hematochezia, or reflux symptoms  Skin: Denies any rashes or skin lesions  Neurology: Patient denies any new motor or sensory losses. Denies any numbness, tingling, weakness, and incoordination of the extremities. Patient also denies any tremor, seizures, or gait instability.  Endocrinology: Denies any polyuria, polydipsia, polyphagia, or heat/cold intolerance.  Psychiatric: He denies any depression, or suicidal/homicidal ideation. Anxiety symptoms have been stable with the current medication.    Objective   /84   Pulse 88   Temp 36.7 °C (98.1 °F)   Ht  "1.575 m (5' 2\")   Wt 89.3 kg (196 lb 12.8 oz)   SpO2 96%   BMI 36.00 kg/m²     Physical Exam  General Appearance: Alert and cooperative, in no acute distress, well-developed/well-nourished obese female  Neck: Supple and without adenopathy or rigidity. There is no JVD at 90° and no carotid bruits are noted. There is no thyromegaly, thyroid tenderness, or palpable thyroid nodules.  Heart: Regular rate and rhythm without murmur or ectopy.  Lungs: Clear to auscultation bilaterally with good air exchange.  Skin: Good turgor, moist, warm and without rashes or lesions.  Neurological exam: Alert and oriented ×3, no tremor, normal gait.  Extremities: No clubbing, cyanosis, or edema  Psychiatric: Appropriate mood and affect, good insight and judgment, no delusions or thought disorders, no suicidal or homicidal ideation    Assessment/Plan     HTN:  Blood pressure appears adequately controlled and we will continue with the current antihypertensive therapy.    Hiatal hernia/GERD:  Stable based on symptoms.  Will continue pantoprazole at the current dose.    Anxiety:  Stable based on symptoms.  We will continue with the current medication.    Hypokalemia:  Stable based on last labs.  Will continue to monitor.    Colonoscopy due: 1/2025  Mammogram due: 3/2025    Influenza vaccine offered. Patient declined.    Scribe Attestation  By signing my name below, Maya PALMER Scribe   attest that this documentation has been prepared under the direction and in the presence of Addison Stack DO.    Requested Prescriptions     Pending Prescriptions Disp Refills    buPROPion XL (Wellbutrin XL) 300 mg 24 hr tablet 90 tablet 0     Sig: Take 1 tablet (300 mg) by mouth once daily in the morning.    labetalol (Normodyne) 100 mg tablet 90 tablet 0     Sig: Take 1 tablet (100 mg) by mouth every 12 hours.    spironolactone (Aldactone) 25 mg tablet 90 tablet 0     Sig: TAKE 1 TABLET BY MOUTH ONCE DAILY       "

## 2024-09-11 NOTE — PATIENT INSTRUCTIONS
Follow up in 3 months.    It was a pleasure to see you today. Thank you for choosing us for your health care needs.    If you have lab or other testing completed and have not been informed of results within one week, please call the office for your results.    If you haven't done so, consider signing up for [facility] [employer], the [facility] personal health record. Ask the staff how you can get started.

## 2024-09-12 ENCOUNTER — OFFICE VISIT (OUTPATIENT)
Dept: OBSTETRICS AND GYNECOLOGY | Facility: CLINIC | Age: 44
End: 2024-09-12
Payer: COMMERCIAL

## 2024-09-12 VITALS — BODY MASS INDEX: 36.07 KG/M2 | WEIGHT: 197.2 LBS | DIASTOLIC BLOOD PRESSURE: 90 MMHG | SYSTOLIC BLOOD PRESSURE: 142 MMHG

## 2024-09-12 DIAGNOSIS — N89.8 VAGINAL DISCHARGE: ICD-10-CM

## 2024-09-12 DIAGNOSIS — N90.7 LABIAL CYST: Primary | ICD-10-CM

## 2024-09-12 PROCEDURE — 3080F DIAST BP >= 90 MM HG: CPT | Performed by: ADVANCED PRACTICE MIDWIFE

## 2024-09-12 PROCEDURE — 87205 SMEAR GRAM STAIN: CPT

## 2024-09-12 PROCEDURE — 87591 N.GONORRHOEAE DNA AMP PROB: CPT

## 2024-09-12 PROCEDURE — 87491 CHLMYD TRACH DNA AMP PROBE: CPT

## 2024-09-12 PROCEDURE — 3077F SYST BP >= 140 MM HG: CPT | Performed by: ADVANCED PRACTICE MIDWIFE

## 2024-09-12 PROCEDURE — 87661 TRICHOMONAS VAGINALIS AMPLIF: CPT

## 2024-09-12 PROCEDURE — 99213 OFFICE O/P EST LOW 20 MIN: CPT | Performed by: ADVANCED PRACTICE MIDWIFE

## 2024-09-12 ASSESSMENT — ENCOUNTER SYMPTOMS
VOMITING: 0
HEMATURIA: 0
SORE THROAT: 0
FREQUENCY: 1
HEADACHES: 0
DIARRHEA: 0
ABDOMINAL PAIN: 0
FLANK PAIN: 0
DYSURIA: 0
FEVER: 0
CHILLS: 0
NAUSEA: 0
ANOREXIA: 0
CONSTIPATION: 0
BACK PAIN: 0

## 2024-09-12 NOTE — PROGRESS NOTES
GYNECOLOGY PROGRESS NOTE        CC:  see below. 2 weeks ago she noted a lump on the Left labia that goes into the vaginal canal. The area is not painful. It is smaller now then when it was first noticed. She also had the same thing 15 years ago and it was opened and drained. 1 week she noted a clear to yellow discharge with some itching.   Chief Complaint   Patient presents with    Follow-up     Est pt visit.   Patient states found a vulva bump on left side. Not painful, and is not bothering her. It is getting smaller.   Patient is also having itching and discharge.         HPI:  Aster Pena is her with a complaints of vaginal discharge clear to yellow.   She denies any new sexual partners, recent antibiotics, or new soaps or detergents.      ROS:  GYN - see HPI        PHYSICAL EXAM:  /90 (BP Location: Left arm, Patient Position: Sitting, BP Cuff Size: Large adult)   Wt 89.4 kg (197 lb 3.2 oz)   BMI 36.07 kg/m²   GEN:  A&O, NAD  URO:  normal urethra, bladder NT  Physical Exam  Genitourinary:      Right Labia: No rash or tenderness.     Left Labia: tenderness and lesions (Left labial cyst 4cms, soft, feels fluid filled. non painful with exam).      Left Labia: No rash.           Vaginal discharge (scant white) present.        LYMPH:  no inguinal lymphadenopathy  PSYCH:  normal affect, non-anxious      IMPRESSION/PLAN:    A: scant discharge. 4cm Left labial cyst, soft  Plan: 1. Vaginal cx. 2. STI cxs sent. 3. Will schedule I&D with  if the area continues to increase in size.     Problem List Items Addressed This Visit       Vaginal discharge    Relevant Orders    C. Trachomatis / N. Gonorrhoeae, Amplified Detection    Trichomonas vaginalis, Nucleic Acid Detection    Vaginitis Gram Stain For Bacterial Vaginosis + Yeast        STD cultures done.  Vulvar hygiene measures and condom use for STD prevention reviewed.       VICENTE Willis-ELISE

## 2024-09-13 ENCOUNTER — PHARMACY VISIT (OUTPATIENT)
Dept: PHARMACY | Facility: CLINIC | Age: 44
End: 2024-09-13
Payer: COMMERCIAL

## 2024-09-13 LAB
C TRACH RRNA SPEC QL NAA+PROBE: NEGATIVE
N GONORRHOEA DNA SPEC QL PROBE+SIG AMP: NEGATIVE
T VAGINALIS RRNA SPEC QL NAA+PROBE: NEGATIVE

## 2024-09-14 LAB
CLUE CELLS VAG LPF-#/AREA: NORMAL /[LPF]
NUGENT SCORE: 0
YEAST VAG WET PREP-#/AREA: NORMAL

## 2024-10-03 ENCOUNTER — APPOINTMENT (OUTPATIENT)
Dept: OBSTETRICS AND GYNECOLOGY | Facility: CLINIC | Age: 44
End: 2024-10-03
Payer: COMMERCIAL

## 2024-12-02 ENCOUNTER — APPOINTMENT (OUTPATIENT)
Dept: OBSTETRICS AND GYNECOLOGY | Facility: CLINIC | Age: 44
End: 2024-12-02
Payer: COMMERCIAL

## 2024-12-02 VITALS
WEIGHT: 199.2 LBS | BODY MASS INDEX: 36.43 KG/M2 | SYSTOLIC BLOOD PRESSURE: 155 MMHG | DIASTOLIC BLOOD PRESSURE: 107 MMHG | HEART RATE: 87 BPM

## 2024-12-02 DIAGNOSIS — N89.8 VAGINAL ITCHING: Primary | ICD-10-CM

## 2024-12-02 DIAGNOSIS — N95.2 ATROPHIC VULVOVAGINITIS: ICD-10-CM

## 2024-12-02 PROCEDURE — 99213 OFFICE O/P EST LOW 20 MIN: CPT | Performed by: ADVANCED PRACTICE MIDWIFE

## 2024-12-02 PROCEDURE — 3080F DIAST BP >= 90 MM HG: CPT | Performed by: ADVANCED PRACTICE MIDWIFE

## 2024-12-02 PROCEDURE — RXMED WILLOW AMBULATORY MEDICATION CHARGE

## 2024-12-02 PROCEDURE — 3077F SYST BP >= 140 MM HG: CPT | Performed by: ADVANCED PRACTICE MIDWIFE

## 2024-12-02 RX ORDER — ESTRADIOL 0.1 MG/G
CREAM VAGINAL
Qty: 42.5 G | Refills: 3 | Status: SHIPPED | OUTPATIENT
Start: 2024-12-02

## 2024-12-02 RX ORDER — TERCONAZOLE 8 MG/G
1 CREAM VAGINAL NIGHTLY
Qty: 20 G | Refills: 0 | Status: SHIPPED | OUTPATIENT
Start: 2024-12-02 | End: 2025-01-02

## 2024-12-02 ASSESSMENT — ENCOUNTER SYMPTOMS
FLANK PAIN: 0
SORE THROAT: 0
DYSURIA: 0
ANOREXIA: 0
CHILLS: 0
FREQUENCY: 1
CONSTIPATION: 0
NAUSEA: 0
BACK PAIN: 0
VOMITING: 0
ABDOMINAL PAIN: 0
HEADACHES: 0
FEVER: 0
HEMATURIA: 0
DIARRHEA: 0

## 2024-12-02 NOTE — PROGRESS NOTES
GYNECOLOGY PROGRESS NOTE        CC:  Patient was treated for +yeast in 7/24. Patient states she has vaginal itching and discomfort that comes and goes every couple of weeks. Used OTC vagasil with some relief at times. Not on any topical estrogen cream at this time. No concerns about STI infections. Last cxs done 9/24 were all negative.   Chief Complaint   Patient presents with    Follow-up     Est pt visit.   Patient here for vulva itching and discomfort        HPI:  Aster Pena is her with a complaints of vaginal irritation/itching.   She denies any new sexual partners, recent antibiotics, or new soaps or detergents.       ROS:  GYN - see HPI        PHYSICAL EXAM:  BP (!) 155/107 (BP Location: Left arm, Patient Position: Sitting, BP Cuff Size: Large adult)   Pulse 87   Wt 90.4 kg (199 lb 3.2 oz)   BMI 36.43 kg/m²   GEN:  A&O, NAD  URO:  normal urethra, bladder NT  GYN:  Thick discharge noted along the labia probably secondary to vagasil use. Thinning noted of the tissue at the vaginal opening No lesions or ulcers,   PSYCH:  normal affect, non-anxious      IMPRESSION/PLAN:    A: vaginal discharge. Atrophy noted at the vaginal opening  Plan: 1. Rx for terazol 3 cream. 2. After use of Terazol she will start topical estrogen cream nightly x 4weeks then  1-2 times per week for maintenance.     Problem List Items Addressed This Visit    None     Vaginal discharge:  Examination consistent with yeast and atrophy.  Rx for terazol and EVC provided, use/AE reviewed.    HANNA Willis

## 2024-12-03 ENCOUNTER — PHARMACY VISIT (OUTPATIENT)
Dept: PHARMACY | Facility: CLINIC | Age: 44
End: 2024-12-03
Payer: COMMERCIAL

## 2024-12-18 ENCOUNTER — APPOINTMENT (OUTPATIENT)
Dept: PRIMARY CARE | Facility: CLINIC | Age: 44
End: 2024-12-18
Payer: COMMERCIAL

## 2024-12-26 ENCOUNTER — PHARMACY VISIT (OUTPATIENT)
Dept: PHARMACY | Facility: CLINIC | Age: 44
End: 2024-12-26
Payer: COMMERCIAL

## 2024-12-26 ENCOUNTER — OFFICE VISIT (OUTPATIENT)
Dept: OBSTETRICS AND GYNECOLOGY | Facility: CLINIC | Age: 44
End: 2024-12-26
Payer: COMMERCIAL

## 2024-12-26 VITALS — WEIGHT: 197.4 LBS | BODY MASS INDEX: 36.1 KG/M2 | SYSTOLIC BLOOD PRESSURE: 170 MMHG | DIASTOLIC BLOOD PRESSURE: 110 MMHG

## 2024-12-26 DIAGNOSIS — N90.7 LABIAL CYST: Primary | ICD-10-CM

## 2024-12-26 PROCEDURE — 3080F DIAST BP >= 90 MM HG: CPT | Performed by: ADVANCED PRACTICE MIDWIFE

## 2024-12-26 PROCEDURE — 1036F TOBACCO NON-USER: CPT | Performed by: ADVANCED PRACTICE MIDWIFE

## 2024-12-26 PROCEDURE — 99212 OFFICE O/P EST SF 10 MIN: CPT | Performed by: ADVANCED PRACTICE MIDWIFE

## 2024-12-26 PROCEDURE — RXMED WILLOW AMBULATORY MEDICATION CHARGE

## 2024-12-26 PROCEDURE — 3077F SYST BP >= 140 MM HG: CPT | Performed by: ADVANCED PRACTICE MIDWIFE

## 2024-12-26 RX ORDER — CEPHALEXIN 500 MG/1
500 CAPSULE ORAL 4 TIMES DAILY
Qty: 28 CAPSULE | Refills: 0 | Status: SHIPPED | OUTPATIENT
Start: 2024-12-26 | End: 2025-01-02

## 2024-12-26 NOTE — PROGRESS NOTES
GYNECOLOGY PROGRESS NOTE          CC:   Patient has had the same lump on the Left side but they resolved on it's own. She was seen earlier this year for the same issue but the area opened and drained on it's own. This is the first time the Right side has enlarged and become painful and firmer.   Chief Complaint   Patient presents with    vaginal lump     Est pt  Has same lump in the past that drained on its own this time it is harder, painful on right side            HPI:  Aster Pena is here with the c/o Right sided labial mass.      ROS:  GYN - see HPI        PHYSICAL EXAM:  BP (!) 170/110   Wt 89.5 kg (197 lb 6.4 oz)   BMI 36.10 kg/m²   GEN:  A&O, NAD  HEENT:  head HC/AT, no visible goiter  Physical Exam  Genitourinary:      Right Labia: tenderness and lesions (3cm cyst extending from RIght labia and along the vaginal wall.).      Right Labia: No rash or skin changes.     Left Labia: No tenderness, skin changes or rash.             PSYCH:  normal affect, non-anxious      IMPRESSION/PLAN:    A: Right labia cyst extending into the Right vaginal wall. Tender to touch. Firm in area but overall soft  Plan; 1. Scheduled for I&D with . 2. Hot compresses or soaking. 3. Antibiotic Rx if needed.     Problem List Items Addressed This Visit    None           VICENTE Willis-ELISE

## 2024-12-30 ENCOUNTER — APPOINTMENT (OUTPATIENT)
Dept: OBSTETRICS AND GYNECOLOGY | Facility: CLINIC | Age: 44
End: 2024-12-30
Payer: COMMERCIAL

## 2025-01-23 DIAGNOSIS — F41.9 ANXIETY: ICD-10-CM

## 2025-01-23 RX ORDER — BUPROPION HYDROCHLORIDE 300 MG/1
300 TABLET ORAL
Qty: 100 TABLET | Refills: 0 | Status: SHIPPED | OUTPATIENT
Start: 2025-01-23

## 2025-01-24 ENCOUNTER — PHARMACY VISIT (OUTPATIENT)
Dept: PHARMACY | Facility: CLINIC | Age: 45
End: 2025-01-24
Payer: COMMERCIAL

## 2025-01-24 PROCEDURE — RXMED WILLOW AMBULATORY MEDICATION CHARGE

## 2025-02-11 ENCOUNTER — PHARMACY VISIT (OUTPATIENT)
Dept: PHARMACY | Facility: CLINIC | Age: 45
End: 2025-02-11
Payer: COMMERCIAL

## 2025-02-11 DIAGNOSIS — F41.9 ANXIETY: ICD-10-CM

## 2025-02-11 DIAGNOSIS — I10 ESSENTIAL HYPERTENSION: ICD-10-CM

## 2025-02-11 PROCEDURE — RXMED WILLOW AMBULATORY MEDICATION CHARGE

## 2025-02-11 RX ORDER — LABETALOL 100 MG/1
100 TABLET, FILM COATED ORAL EVERY 12 HOURS
Qty: 100 TABLET | Refills: 0 | Status: SHIPPED | OUTPATIENT
Start: 2025-02-11

## 2025-02-11 RX ORDER — SPIRONOLACTONE 25 MG/1
25 TABLET ORAL DAILY
Qty: 100 TABLET | Refills: 0 | Status: SHIPPED | OUTPATIENT
Start: 2025-02-11

## 2025-02-27 ENCOUNTER — APPOINTMENT (OUTPATIENT)
Dept: PRIMARY CARE | Facility: CLINIC | Age: 45
End: 2025-02-27
Payer: COMMERCIAL

## 2025-02-27 ENCOUNTER — LAB (OUTPATIENT)
Dept: LAB | Facility: HOSPITAL | Age: 45
End: 2025-02-27
Payer: COMMERCIAL

## 2025-02-27 VITALS
WEIGHT: 201.6 LBS | BODY MASS INDEX: 37.1 KG/M2 | OXYGEN SATURATION: 95 % | DIASTOLIC BLOOD PRESSURE: 90 MMHG | SYSTOLIC BLOOD PRESSURE: 150 MMHG | HEART RATE: 93 BPM | HEIGHT: 62 IN | TEMPERATURE: 98.6 F

## 2025-02-27 DIAGNOSIS — I10 ESSENTIAL HYPERTENSION: Primary | ICD-10-CM

## 2025-02-27 DIAGNOSIS — I10 ESSENTIAL HYPERTENSION: ICD-10-CM

## 2025-02-27 DIAGNOSIS — E87.6 HYPOKALEMIA: ICD-10-CM

## 2025-02-27 DIAGNOSIS — F41.9 ANXIETY: ICD-10-CM

## 2025-02-27 LAB
ALBUMIN SERPL BCP-MCNC: 4.1 G/DL (ref 3.4–5)
ALP SERPL-CCNC: 76 U/L (ref 33–110)
ALT SERPL W P-5'-P-CCNC: 10 U/L (ref 7–45)
ANION GAP SERPL CALC-SCNC: 11 MMOL/L (ref 10–20)
AST SERPL W P-5'-P-CCNC: 12 U/L (ref 9–39)
BILIRUB SERPL-MCNC: 0.4 MG/DL (ref 0–1.2)
BUN SERPL-MCNC: 5 MG/DL (ref 6–23)
CALCIUM SERPL-MCNC: 9.2 MG/DL (ref 8.6–10.3)
CHLORIDE SERPL-SCNC: 100 MMOL/L (ref 98–107)
CO2 SERPL-SCNC: 30 MMOL/L (ref 21–32)
CREAT SERPL-MCNC: 0.96 MG/DL (ref 0.5–1.05)
EGFRCR SERPLBLD CKD-EPI 2021: 75 ML/MIN/1.73M*2
GLUCOSE SERPL-MCNC: 98 MG/DL (ref 74–99)
POTASSIUM SERPL-SCNC: 4.1 MMOL/L (ref 3.5–5.3)
PROT SERPL-MCNC: 6.8 G/DL (ref 6.4–8.2)
SODIUM SERPL-SCNC: 137 MMOL/L (ref 136–145)

## 2025-02-27 PROCEDURE — 3077F SYST BP >= 140 MM HG: CPT | Performed by: FAMILY MEDICINE

## 2025-02-27 PROCEDURE — 3008F BODY MASS INDEX DOCD: CPT | Performed by: FAMILY MEDICINE

## 2025-02-27 PROCEDURE — RXMED WILLOW AMBULATORY MEDICATION CHARGE

## 2025-02-27 PROCEDURE — 99214 OFFICE O/P EST MOD 30 MIN: CPT | Performed by: FAMILY MEDICINE

## 2025-02-27 PROCEDURE — 1036F TOBACCO NON-USER: CPT | Performed by: FAMILY MEDICINE

## 2025-02-27 PROCEDURE — 36415 COLL VENOUS BLD VENIPUNCTURE: CPT

## 2025-02-27 PROCEDURE — 80053 COMPREHEN METABOLIC PANEL: CPT

## 2025-02-27 PROCEDURE — 3080F DIAST BP >= 90 MM HG: CPT | Performed by: FAMILY MEDICINE

## 2025-02-27 RX ORDER — BUSPIRONE HYDROCHLORIDE 5 MG/1
TABLET ORAL
Qty: 120 TABLET | Refills: 2 | Status: SHIPPED | OUTPATIENT
Start: 2025-02-27 | End: 2025-04-01

## 2025-02-27 RX ORDER — LABETALOL 100 MG/1
200 TABLET, FILM COATED ORAL EVERY 12 HOURS
Status: SHIPPED
Start: 2025-02-27

## 2025-02-27 ASSESSMENT — PATIENT HEALTH QUESTIONNAIRE - PHQ9
SUM OF ALL RESPONSES TO PHQ QUESTIONS 1-9: 19
2. FEELING DOWN, DEPRESSED OR HOPELESS: NEARLY EVERY DAY
10. IF YOU CHECKED OFF ANY PROBLEMS, HOW DIFFICULT HAVE THESE PROBLEMS MADE IT FOR YOU TO DO YOUR WORK, TAKE CARE OF THINGS AT HOME, OR GET ALONG WITH OTHER PEOPLE: VERY DIFFICULT
6. FEELING BAD ABOUT YOURSELF - OR THAT YOU ARE A FAILURE OR HAVE LET YOURSELF OR YOUR FAMILY DOWN: MORE THAN HALF THE DAYS
5. POOR APPETITE OR OVEREATING: NEARLY EVERY DAY
8. MOVING OR SPEAKING SO SLOWLY THAT OTHER PEOPLE COULD HAVE NOTICED. OR THE OPPOSITE, BEING SO FIGETY OR RESTLESS THAT YOU HAVE BEEN MOVING AROUND A LOT MORE THAN USUAL: NOT AT ALL
4. FEELING TIRED OR HAVING LITTLE ENERGY: NEARLY EVERY DAY
9. THOUGHTS THAT YOU WOULD BE BETTER OFF DEAD, OR OF HURTING YOURSELF: NOT AT ALL
7. TROUBLE CONCENTRATING ON THINGS, SUCH AS READING THE NEWSPAPER OR WATCHING TELEVISION: NEARLY EVERY DAY
SUM OF ALL RESPONSES TO PHQ9 QUESTIONS 1 AND 2: 5
1. LITTLE INTEREST OR PLEASURE IN DOING THINGS: MORE THAN HALF THE DAYS
3. TROUBLE FALLING OR STAYING ASLEEP OR SLEEPING TOO MUCH: NEARLY EVERY DAY
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
## 2025-02-27 NOTE — PATIENT INSTRUCTIONS
Increase the labetalol and start the buspar as we discussed.    Schedule with counseling soon.    Follow up in 4 weeks or sooner if needed.    It was a pleasure to see you today. Thank you for choosing us for your health care needs.    If you have lab or other testing completed and have not been informed of results within one week, please call the office for your results.    If you haven't done so, consider signing up for [facility] [employer], the [facility] personal health record. Ask the staff how you can get started.

## 2025-02-28 ENCOUNTER — PHARMACY VISIT (OUTPATIENT)
Dept: PHARMACY | Facility: CLINIC | Age: 45
End: 2025-02-28
Payer: COMMERCIAL

## 2025-03-05 ENCOUNTER — TELEPHONE (OUTPATIENT)
Dept: PRIMARY CARE | Facility: CLINIC | Age: 45
End: 2025-03-05
Payer: COMMERCIAL

## 2025-03-05 NOTE — PROGRESS NOTES
Writer outreached pt regarding their referral to Collaborative Care. Explained program and answered pt's questions. Pt requested to schedule future initial assessment. Pt is scheduled for 4/15 @ 4pm in person.

## 2025-03-17 ENCOUNTER — OFFICE VISIT (OUTPATIENT)
Dept: OBSTETRICS AND GYNECOLOGY | Facility: CLINIC | Age: 45
End: 2025-03-17
Payer: COMMERCIAL

## 2025-03-17 DIAGNOSIS — R10.2 PELVIC PAIN IN FEMALE: Primary | ICD-10-CM

## 2025-03-17 PROCEDURE — 99213 OFFICE O/P EST LOW 20 MIN: CPT | Performed by: OBSTETRICS & GYNECOLOGY

## 2025-03-21 NOTE — PROGRESS NOTES
GYN PROGRESS NOTE          Chief complaint: follow up    HPI:  Patient answers are not available for this visit.  HPI       Pelvic Pain     Additional comments: Est pt             Comments    Had ovarian cyst removed from  right ovary pt is feeling same pain on the left side   since having iud she's not had bleeding now   having spotting same time she is feeling the pain  Pain comes and goes for the last month the pain has been increasing, can be sever sharp or ache throbbing   Chaperone student          Last edited by Yamilex Tejada MA on 3/17/2025  1:09 PM.          Reviewed case with patient, reviewed plans.        Prior adnexal cyst with pain has similar sensations now    Discuss ultrasound and reevaluation    Has some irregular spotting if persistent recommend biopsy    ROS:  GEN - no fevers or chills  RESP - no SOB or cough  GYN - see HPI      HISTORY:  Past Medical History:   Diagnosis Date    Diaphragmatic hernia without obstruction or gangrene 12/22/2022    Hiatal hernia    Hypertension     Hypokalemia 12/22/2022    Hypokalemia    Personal history of other diseases of the circulatory system 12/30/2020    History of hypertension    Personal history of other diseases of the nervous system and sense organs 12/30/2020    History of sleep apnea    Personal history of other specified conditions 11/11/2020    History of insomnia     Past Surgical History:   Procedure Laterality Date    OTHER SURGICAL HISTORY  08/28/2019    Heart surgery    OTHER SURGICAL HISTORY  07/12/2022    Ovarian cystectomy    OTHER SURGICAL HISTORY  07/12/2022    Enterolysis    OTHER SURGICAL HISTORY  07/12/2022    Intrauterine device placement     Social History     Socioeconomic History    Marital status: Single     Spouse name: Not on file    Number of children: Not on file    Years of education: Not on file    Highest education level: Not on file   Occupational History    Not on file   Tobacco Use    Smoking status: Former     Current  packs/day: 0.00     Average packs/day: 1 pack/day for 20.0 years (20.0 ttl pk-yrs)     Types: Cigarettes     Start date:      Quit date:      Years since quittin.2    Smokeless tobacco: Never   Vaping Use    Vaping status: Never Used   Substance and Sexual Activity    Alcohol use: Not Currently     Comment: rarely    Drug use: Never    Sexual activity: Yes     Partners: Male     Birth control/protection: Condom Male, I.U.D.   Other Topics Concern    Not on file   Social History Narrative    Not on file     Social Drivers of Health     Financial Resource Strain: Not on file   Food Insecurity: Not on file   Transportation Needs: Not on file   Physical Activity: Not on file   Stress: Not on file   Social Connections: Not on file   Intimate Partner Violence: Not on file   Housing Stability: Not on file     Cancer-related family history includes Brain cancer in her father; Cancer in her father.       PHYSICAL EXAM:  There were no vitals taken for this visit.  GEN:  A&O, NAD  HEENT:  head HC/AT, no visible goiter  PSYCH:  normal affect, non-anxious      IMPRESSION/PLAN:  45-year-old pelvic pain similar to prior adnexal mass    Ultrasound now    Will discuss ultrasound review period's biopsy if required          Eliecer Heart MD

## 2025-04-01 ENCOUNTER — APPOINTMENT (OUTPATIENT)
Dept: PRIMARY CARE | Facility: CLINIC | Age: 45
End: 2025-04-01
Payer: COMMERCIAL

## 2025-04-09 ENCOUNTER — APPOINTMENT (OUTPATIENT)
Dept: PRIMARY CARE | Facility: CLINIC | Age: 45
End: 2025-04-09
Payer: COMMERCIAL

## 2025-04-15 ENCOUNTER — APPOINTMENT (OUTPATIENT)
Dept: PRIMARY CARE | Facility: CLINIC | Age: 45
End: 2025-04-15
Payer: COMMERCIAL

## 2025-04-15 ASSESSMENT — ANXIETY QUESTIONNAIRES
5. BEING SO RESTLESS THAT IT IS HARD TO SIT STILL: SEVERAL DAYS
4. TROUBLE RELAXING: SEVERAL DAYS
1. FEELING NERVOUS, ANXIOUS, OR ON EDGE: NEARLY EVERY DAY
GAD7 TOTAL SCORE: 10
6. BECOMING EASILY ANNOYED OR IRRITABLE: SEVERAL DAYS
2. NOT BEING ABLE TO STOP OR CONTROL WORRYING: SEVERAL DAYS
IF YOU CHECKED OFF ANY PROBLEMS ON THIS QUESTIONNAIRE, HOW DIFFICULT HAVE THESE PROBLEMS MADE IT FOR YOU TO DO YOUR WORK, TAKE CARE OF THINGS AT HOME, OR GET ALONG WITH OTHER PEOPLE: SOMEWHAT DIFFICULT
7. FEELING AFRAID AS IF SOMETHING AWFUL MIGHT HAPPEN: SEVERAL DAYS
3. WORRYING TOO MUCH ABOUT DIFFERENT THINGS: MORE THAN HALF THE DAYS

## 2025-04-15 ASSESSMENT — PATIENT HEALTH QUESTIONNAIRE - PHQ9
9. THOUGHTS THAT YOU WOULD BE BETTER OFF DEAD, OR OF HURTING YOURSELF: NOT AT ALL
7. TROUBLE CONCENTRATING ON THINGS, SUCH AS READING THE NEWSPAPER OR WATCHING TELEVISION: SEVERAL DAYS
6. FEELING BAD ABOUT YOURSELF - OR THAT YOU ARE A FAILURE OR HAVE LET YOURSELF OR YOUR FAMILY DOWN: SEVERAL DAYS
SUM OF ALL RESPONSES TO PHQ9 QUESTIONS 1 & 2: 3
2. FEELING DOWN, DEPRESSED OR HOPELESS: MORE THAN HALF THE DAYS
10. IF YOU CHECKED OFF ANY PROBLEMS, HOW DIFFICULT HAVE THESE PROBLEMS MADE IT FOR YOU TO DO YOUR WORK, TAKE CARE OF THINGS AT HOME, OR GET ALONG WITH OTHER PEOPLE: SOMEWHAT DIFFICULT
SUM OF ALL RESPONSES TO PHQ QUESTIONS 1-9: 10
1. LITTLE INTEREST OR PLEASURE IN DOING THINGS: SEVERAL DAYS
5. POOR APPETITE OR OVEREATING: NEARLY EVERY DAY
3. TROUBLE FALLING OR STAYING ASLEEP: NOT AT ALL
4. FEELING TIRED OR HAVING LITTLE ENERGY: MORE THAN HALF THE DAYS
8. MOVING OR SPEAKING SO SLOWLY THAT OTHER PEOPLE COULD HAVE NOTICED. OR THE OPPOSITE, BEING SO FIGETY OR RESTLESS THAT YOU HAVE BEEN MOVING AROUND A LOT MORE THAN USUAL: NOT AT ALL
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
## 2025-04-16 ENCOUNTER — APPOINTMENT (OUTPATIENT)
Dept: OBSTETRICS AND GYNECOLOGY | Facility: CLINIC | Age: 45
End: 2025-04-16
Payer: COMMERCIAL

## 2025-04-16 ENCOUNTER — APPOINTMENT (OUTPATIENT)
Dept: RADIOLOGY | Facility: CLINIC | Age: 45
End: 2025-04-16
Payer: COMMERCIAL
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
## 2025-04-17 ENCOUNTER — DOCUMENTATION (OUTPATIENT)
Dept: BEHAVIORAL HEALTH | Facility: CLINIC | Age: 45
End: 2025-04-17
Payer: COMMERCIAL

## 2025-04-17 ENCOUNTER — TELEPHONE (OUTPATIENT)
Dept: PRIMARY CARE | Facility: CLINIC | Age: 45
End: 2025-04-17
Payer: COMMERCIAL
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
## 2025-04-30 ENCOUNTER — DOCUMENTATION (OUTPATIENT)
Dept: PRIMARY CARE | Facility: CLINIC | Age: 45
End: 2025-04-30
Payer: COMMERCIAL

## 2025-04-30 DIAGNOSIS — F41.8 DEPRESSION WITH ANXIETY: Primary | ICD-10-CM

## 2025-05-14 ENCOUNTER — SOCIAL WORK (OUTPATIENT)
Dept: PRIMARY CARE | Facility: CLINIC | Age: 45
End: 2025-05-14
Payer: COMMERCIAL

## 2025-05-14 ENCOUNTER — APPOINTMENT (OUTPATIENT)
Dept: PRIMARY CARE | Facility: CLINIC | Age: 45
End: 2025-05-14
Payer: COMMERCIAL

## 2025-05-14 VITALS
OXYGEN SATURATION: 96 % | HEIGHT: 62 IN | WEIGHT: 199.8 LBS | HEART RATE: 66 BPM | SYSTOLIC BLOOD PRESSURE: 128 MMHG | BODY MASS INDEX: 36.77 KG/M2 | DIASTOLIC BLOOD PRESSURE: 84 MMHG | TEMPERATURE: 98.1 F

## 2025-05-14 DIAGNOSIS — R42 DIZZINESS AND GIDDINESS: ICD-10-CM

## 2025-05-14 DIAGNOSIS — E78.2 MIXED HYPERLIPIDEMIA: ICD-10-CM

## 2025-05-14 DIAGNOSIS — E87.6 HYPOKALEMIA: ICD-10-CM

## 2025-05-14 DIAGNOSIS — F41.9 ANXIETY: ICD-10-CM

## 2025-05-14 DIAGNOSIS — I10 ESSENTIAL HYPERTENSION: Primary | ICD-10-CM

## 2025-05-14 DIAGNOSIS — F41.8 DEPRESSION WITH ANXIETY: ICD-10-CM

## 2025-05-14 DIAGNOSIS — K21.9 GASTROESOPHAGEAL REFLUX DISEASE WITHOUT ESOPHAGITIS: ICD-10-CM

## 2025-05-14 PROCEDURE — 3074F SYST BP LT 130 MM HG: CPT | Performed by: FAMILY MEDICINE

## 2025-05-14 PROCEDURE — RXMED WILLOW AMBULATORY MEDICATION CHARGE

## 2025-05-14 PROCEDURE — 3079F DIAST BP 80-89 MM HG: CPT | Performed by: FAMILY MEDICINE

## 2025-05-14 PROCEDURE — 99214 OFFICE O/P EST MOD 30 MIN: CPT | Performed by: FAMILY MEDICINE

## 2025-05-14 PROCEDURE — 3008F BODY MASS INDEX DOCD: CPT | Performed by: FAMILY MEDICINE

## 2025-05-14 RX ORDER — BUSPIRONE HYDROCHLORIDE 10 MG/1
10 TABLET ORAL 2 TIMES DAILY
Qty: 200 TABLET | Refills: 0 | Status: SHIPPED | OUTPATIENT
Start: 2025-05-14 | End: 2025-06-02 | Stop reason: SINTOL

## 2025-05-14 RX ORDER — LABETALOL 100 MG/1
100 TABLET, FILM COATED ORAL EVERY 12 HOURS
Qty: 200 TABLET | Refills: 0 | Status: SHIPPED | OUTPATIENT
Start: 2025-05-14

## 2025-05-14 RX ORDER — PANTOPRAZOLE SODIUM 40 MG/1
40 TABLET, DELAYED RELEASE ORAL
Qty: 200 TABLET | Refills: 0 | Status: SHIPPED | OUTPATIENT
Start: 2025-05-14

## 2025-05-14 RX ORDER — BUPROPION HYDROCHLORIDE 300 MG/1
300 TABLET ORAL
Qty: 100 TABLET | Refills: 0 | Status: SHIPPED | OUTPATIENT
Start: 2025-05-14

## 2025-05-14 RX ORDER — SPIRONOLACTONE 25 MG/1
25 TABLET ORAL DAILY
Qty: 100 TABLET | Refills: 0 | Status: SHIPPED | OUTPATIENT
Start: 2025-05-14

## 2025-05-14 NOTE — PROGRESS NOTES
Subjective   Patient ID: Aster Pena is a 45 y.o. female who presents for Follow-up.    HPI     Patient c/o having increased dizziness spells.  She states she has been dealing with this for a few years but has recently gotten worse.     Patient states she was in the kitchen when suddenly felt like she may faint with tunnel hearing and dizziness 1 week ago.   She states she feels these symptoms while standing and sitting.   It is not related to changed in position.    Pt has a family hx of vertigo.         She has anxiety/depression.  She was experiencing increased symptoms at her last visit and she was started on BuSpar.  Has tolerated it well.  We referred to behavioral health and she has been going to counseling.     Pt has HTN.  Patient does not monitor BP at home but has a machine that she can use if needed.  Denies CP, SOB, dizziness, and LE edema.   Patient is compliant with antihypertensive therapy and denies any noted side effects.     She has hyperlipidemia.  This has been managed conservatively with appropriate dietary changes up to this point.    Patient has a hiatal hernia with GERD.  Her symptoms are stable on the pantoprazole.   Patient denies any breakthrough reflux symptoms.     She has hypokalemia.  Pt is maintained on supplemental potassium once a day.           Review of Systems  Constitutional: Patient denies any fever, chills, loss of appetite, or unexplained weight loss.  Positive for dizziness and feeling unbalanced.    Cardiovascular: Patient denies any chest pain, shortness of breath with exertion, tachycardia, palpitations, orthopnea, or paroxysmal nocturnal dyspnea.  Respiratory: Patient denies any cough, shortness of breath, or wheezing.  Gastrointestinal: Patient denies any nausea, vomiting, diarrhea, constipation, melena, hematochezia, or reflux symptoms  Skin: Denies any rashes or skin lesions  Neurology: Patient denies any new motor or sensory losses. Denies any numbness,  "tingling, weakness, and incoordination of the extremities. Patient also denies any tremor, seizures, or gait instability.  Endocrinology: Denies any polyuria, polydipsia, polyphagia, or heat/cold intolerance.  Psychiatric: Patient denies any depression, or suicidal/homicidal ideation. Anxiety symptoms have been stable with the current medication.    Objective   /84 (BP Location: Right arm, Patient Position: Sitting, BP Cuff Size: Adult)   Pulse 66   Temp 36.7 °C (98.1 °F) (Temporal)   Ht 1.575 m (5' 2\")   Wt 90.6 kg (199 lb 12.8 oz)   SpO2 96%   BMI 36.54 kg/m²     Physical Exam  General Appearance: Alert and cooperative, in no acute distress, well-developed/well-nourished obese female.    Neck: Supple and without adenopathy or rigidity. There is no JVD at 90° and no carotid bruits are noted. There is no thyromegaly, thyroid tenderness, or palpable thyroid nodules.  Heart: Regular rate and rhythm without murmur or ectopy.  Lungs: Clear to auscultation bilaterally with good air exchange.  Skin: Good turgor, moist, warm and without rashes or lesions.  Neurological exam: Alert and oriented ×3, no tremor, normal gait.  Extremities: No clubbing, cyanosis, or edema  Psychiatric: Appropriate mood and affect, good insight and judgment, no delusions or thought disorders, no suicidal or homicidal ideation    Assessment/Plan     Essential hypertension:  Blood pressure appears adequately controlled and we will continue with the current antihypertensive therapy.    Mixed hyperlipidemia:  Dietary changes, exercise, and maintenance of healthy weight were discussed at length.  Lab Results   Component Value Date    CHOL 212 (H) 06/07/2022    CHOL 201 (H) 10/06/2020    CHOL 193 08/02/2019     Lab Results   Component Value Date    HDL 40.0 06/07/2022    HDL 37.0 (A) 10/06/2020    HDL 36.0 (A) 08/02/2019     No results found for: \"LDLCALC\"  Lab Results   Component Value Date    TRIG 130 06/07/2022    TRIG 153 (H) 10/06/2020 "    TRIG 140 08/02/2019     Gastroesophageal reflux disease without esophagitis:  Stable.  Continue pantoprazole at the current dose.    Hypokalemia:  Stable based on last labs.  Will continue to monitor.    Anxiety:  Stable based on symptoms.   Continue current medications.    Depression with anxiety:  Stable based on symptoms.   Continue current medications.    Dizziness and giddiness:  Will refer her to Neurology for evaluation.  Advised pt to lay down and prop her feet up if another vertigo episode occurs and she feels as she may pass out.        Scribe Attestation  By signing my name below, I, Kye Schwab   attest that this documentation has been prepared under the direction and in the presence of Addison Stack DO.    Orders Placed This Encounter   Procedures    Comprehensive Metabolic Panel    Lipid Panel    TSH with reflex to Free T4 if abnormal    Referral to Neurology     Requested Prescriptions     Signed Prescriptions Disp Refills    buPROPion XL (Wellbutrin XL) 300 mg 24 hr tablet 100 tablet 0     Sig: Take 1 tablet (300 mg) by mouth once daily in the morning.    busPIRone (Buspar) 10 mg tablet 200 tablet 0     Sig: Take 1 tablet (10 mg) by mouth 2 times a day.    spironolactone (Aldactone) 25 mg tablet 100 tablet 0     Sig: Take 1 tablet (25 mg) by mouth once daily.    pantoprazole (ProtoNix) 40 mg EC tablet 200 tablet 0     Sig: Take 1 tablet (40 mg) by mouth 2 times a day before meals.    labetalol (Normodyne) 100 mg tablet 200 tablet 0     Sig: Take 1 tablet (100 mg) by mouth every 12 hours.

## 2025-05-14 NOTE — PATIENT INSTRUCTIONS
Follow up in 3 months with labs to be done PRIOR.    It was a pleasure to see you today. Thank you for choosing us for your health care needs.    If you have lab or other testing completed and have not been informed of results within one week, please call the office for your results.    If you haven't done so, consider signing up for [facility], the [facility] personal health record. Ask the staff how you can get started.

## 2025-05-14 NOTE — PROGRESS NOTES
"Collaborative Care (CoCM)  Progress Note    Type of Interaction: In Office    Start Time: 1:58pm    End Time: 2:17pm    Appointment: Not Scheduled    Reason for Visit:   Chief Complaint   Patient presents with    Depression    Anxiety     Interval History / Patient Symptoms:     Stable / occ \"bad days\"     Interventions Provided: [place] Setting, Behavioral Activation, Motivational Interviewing, Strengths Exploration, Develop Coping Strategies, Review Progress/Goals Stress Management, Mindfulness, and Treatment Planning    Progress Made: Moderate    Response to Intervention: Patient endorsed feeling stable right now, reported min contact with ex. When they have communicated she has been communicating assertively. Overall, felt well. Busy with crafting and craft shows. Worried about the future/some down days. Related to apartment issues. Writer encouraged nourishing meaningful relationships, moving body in a meaningful way, healthy balanced nutrition, a regular sleep schedule, and overall increased self-care to maintain mental health stability.      Plan: Monitor mood/support     Patient Instructions   6/12 @ 12pm    Follow Up / Next Appointment: Next appointment: 06/12/25      "

## 2025-05-16 ENCOUNTER — PHARMACY VISIT (OUTPATIENT)
Dept: PHARMACY | Facility: CLINIC | Age: 45
End: 2025-05-16
Payer: COMMERCIAL

## 2025-05-30 ENCOUNTER — DOCUMENTATION (OUTPATIENT)
Dept: PRIMARY CARE | Facility: CLINIC | Age: 45
End: 2025-05-30

## 2025-05-30 ENCOUNTER — LAB (OUTPATIENT)
Dept: LAB | Facility: HOSPITAL | Age: 45
End: 2025-05-30
Payer: COMMERCIAL

## 2025-05-30 ENCOUNTER — APPOINTMENT (OUTPATIENT)
Dept: LAB | Facility: HOSPITAL | Age: 45
End: 2025-05-30
Payer: COMMERCIAL

## 2025-05-30 DIAGNOSIS — E55.9 VITAMIN D DEFICIENCY, UNSPECIFIED: ICD-10-CM

## 2025-05-30 DIAGNOSIS — C90.00 MULTIPLE MYELOMA NOT HAVING ACHIEVED REMISSION (MULTI): ICD-10-CM

## 2025-05-30 DIAGNOSIS — M12.9 ARTHROPATHY, UNSPECIFIED: ICD-10-CM

## 2025-05-30 DIAGNOSIS — M12.9 ARTHROPATHY, UNSPECIFIED: Primary | ICD-10-CM

## 2025-05-30 DIAGNOSIS — R94.6 ABNORMAL RESULTS OF THYROID FUNCTION STUDIES: ICD-10-CM

## 2025-05-30 DIAGNOSIS — E78.2 MIXED HYPERLIPIDEMIA: Primary | ICD-10-CM

## 2025-05-30 DIAGNOSIS — E53.8 DEFICIENCY OF OTHER SPECIFIED B GROUP VITAMINS: ICD-10-CM

## 2025-05-30 DIAGNOSIS — R73.09 OTHER ABNORMAL GLUCOSE: ICD-10-CM

## 2025-05-30 DIAGNOSIS — F41.8 DEPRESSION WITH ANXIETY: Primary | ICD-10-CM

## 2025-05-30 LAB
25(OH)D3 SERPL-MCNC: 32 NG/ML (ref 30–100)
EST. AVERAGE GLUCOSE BLD GHB EST-MCNC: 126 MG/DL
FOLATE SERPL-MCNC: 7.8 NG/ML
HBA1C MFR BLD: 6 % (ref ?–5.7)
PROT SERPL-MCNC: 7.1 G/DL (ref 6.4–8.2)
TSH SERPL-ACNC: 2.32 MIU/L (ref 0.44–3.98)
VIT B12 SERPL-MCNC: 245 PG/ML (ref 211–911)

## 2025-05-30 PROCEDURE — 86038 ANTINUCLEAR ANTIBODIES: CPT | Mod: ELYLAB

## 2025-05-30 PROCEDURE — 82746 ASSAY OF FOLIC ACID SERUM: CPT | Mod: ELYLAB

## 2025-05-30 PROCEDURE — 84165 PROTEIN E-PHORESIS SERUM: CPT | Mod: ELYLAB

## 2025-05-30 PROCEDURE — 84443 ASSAY THYROID STIM HORMONE: CPT

## 2025-05-30 PROCEDURE — 82607 VITAMIN B-12: CPT | Mod: ELYLAB

## 2025-05-30 PROCEDURE — 82306 VITAMIN D 25 HYDROXY: CPT | Mod: ELYLAB

## 2025-05-30 PROCEDURE — 36415 COLL VENOUS BLD VENIPUNCTURE: CPT

## 2025-05-30 PROCEDURE — 83036 HEMOGLOBIN GLYCOSYLATED A1C: CPT | Mod: ELYLAB

## 2025-05-30 PROCEDURE — 84155 ASSAY OF PROTEIN SERUM: CPT | Mod: ELYLAB

## 2025-06-02 ENCOUNTER — PATIENT MESSAGE (OUTPATIENT)
Dept: PRIMARY CARE | Facility: CLINIC | Age: 45
End: 2025-06-02
Payer: COMMERCIAL

## 2025-06-02 DIAGNOSIS — F41.8 DEPRESSION WITH ANXIETY: Primary | ICD-10-CM

## 2025-06-02 LAB — ANA SER QL HEP2 SUBST: NEGATIVE

## 2025-06-02 RX ORDER — FLUOXETINE 10 MG/1
10 CAPSULE ORAL DAILY
Qty: 30 CAPSULE | Refills: 1 | Status: SHIPPED | OUTPATIENT
Start: 2025-06-02 | End: 2025-08-01

## 2025-06-03 LAB
ALBUMIN: 4.1 G/DL (ref 3.4–5)
ALPHA 1 GLOBULIN: 0.3 G/DL (ref 0.2–0.6)
ALPHA 2 GLOBULIN: 0.8 G/DL (ref 0.4–1.1)
BETA GLOBULIN: 0.9 G/DL (ref 0.5–1.2)
GAMMA GLOBULIN: 1 G/DL (ref 0.5–1.4)
PATH REVIEW-SERUM PROTEIN ELECTROPHORESIS: NORMAL
PROTEIN ELECTROPHORESIS COMMENT: NORMAL

## 2025-06-03 PROCEDURE — RXMED WILLOW AMBULATORY MEDICATION CHARGE

## 2025-06-04 ENCOUNTER — PHARMACY VISIT (OUTPATIENT)
Dept: PHARMACY | Facility: CLINIC | Age: 45
End: 2025-06-04
Payer: COMMERCIAL

## 2025-06-04 PROCEDURE — RXMED WILLOW AMBULATORY MEDICATION CHARGE

## 2025-06-05 ENCOUNTER — HOSPITAL ENCOUNTER (OUTPATIENT)
Dept: RADIOLOGY | Facility: HOSPITAL | Age: 45
Discharge: HOME | End: 2025-06-05
Payer: COMMERCIAL

## 2025-06-05 DIAGNOSIS — H81.4 VERTIGO OF CENTRAL ORIGIN: ICD-10-CM

## 2025-06-05 DIAGNOSIS — R26.81 UNSTEADINESS ON FEET: ICD-10-CM

## 2025-06-05 PROCEDURE — 70553 MRI BRAIN STEM W/O & W/DYE: CPT

## 2025-06-05 PROCEDURE — 2550000001 HC RX 255 CONTRASTS: Performed by: SPECIALIST

## 2025-06-05 PROCEDURE — A9575 INJ GADOTERATE MEGLUMI 0.1ML: HCPCS | Performed by: SPECIALIST

## 2025-06-05 RX ORDER — GADOTERATE MEGLUMINE 376.9 MG/ML
0.2 INJECTION INTRAVENOUS
Status: COMPLETED | OUTPATIENT
Start: 2025-06-05 | End: 2025-06-05

## 2025-06-05 RX ADMIN — GADOTERATE MEGLUMINE 18 ML: 376.9 INJECTION INTRAVENOUS at 10:32

## 2025-06-12 ENCOUNTER — TELEPHONE (OUTPATIENT)
Dept: PRIMARY CARE | Facility: CLINIC | Age: 45
End: 2025-06-12

## 2025-06-12 ENCOUNTER — APPOINTMENT (OUTPATIENT)
Dept: PRIMARY CARE | Facility: CLINIC | Age: 45
End: 2025-06-12
Payer: COMMERCIAL

## 2025-06-27 ENCOUNTER — APPOINTMENT (OUTPATIENT)
Dept: RADIOLOGY | Facility: HOSPITAL | Age: 45
End: 2025-06-27
Payer: COMMERCIAL

## 2025-06-27 PROCEDURE — RXMED WILLOW AMBULATORY MEDICATION CHARGE

## 2025-07-03 ENCOUNTER — TELEPHONE (OUTPATIENT)
Dept: PRIMARY CARE | Facility: CLINIC | Age: 45
End: 2025-07-03
Payer: COMMERCIAL

## 2025-07-03 NOTE — PROGRESS NOTES
Writer has outreached pt in an attempt to reengage in Collaborative Care. Due to a lack of response to outreach, pt is being closed from Collaborative Care this time. If additional support needs arise, pt can be referred back to programming.     Patient last engaged in Collaborative Care sessions on May 14, 2025  Patient cancelled/no showed last appointment on June 12, 2025

## 2025-07-09 ENCOUNTER — PHARMACY VISIT (OUTPATIENT)
Dept: PHARMACY | Facility: CLINIC | Age: 45
End: 2025-07-09
Payer: COMMERCIAL

## 2025-07-17 ENCOUNTER — TELEPHONE (OUTPATIENT)
Dept: PRIMARY CARE | Facility: CLINIC | Age: 45
End: 2025-07-17
Payer: COMMERCIAL

## 2025-07-17 NOTE — TELEPHONE ENCOUNTER
Advise pt letter has been done of service letter for her to take to her apartments. Will be in the  filer.

## 2025-07-31 ENCOUNTER — DOCUMENTATION (OUTPATIENT)
Dept: PRIMARY CARE | Facility: CLINIC | Age: 45
End: 2025-07-31
Payer: COMMERCIAL

## 2025-08-14 DIAGNOSIS — E78.2 MIXED HYPERLIPIDEMIA: ICD-10-CM

## 2025-08-14 DIAGNOSIS — I10 ESSENTIAL HYPERTENSION: ICD-10-CM

## 2025-08-20 ENCOUNTER — APPOINTMENT (OUTPATIENT)
Dept: PRIMARY CARE | Facility: CLINIC | Age: 45
End: 2025-08-20
Payer: COMMERCIAL

## 2025-08-22 ENCOUNTER — LAB (OUTPATIENT)
Facility: HOSPITAL | Age: 45
End: 2025-08-22
Payer: COMMERCIAL

## 2025-08-22 LAB
ALBUMIN SERPL BCP-MCNC: 4.3 G/DL (ref 3.4–5)
ALP SERPL-CCNC: 65 U/L (ref 33–110)
ALT SERPL W P-5'-P-CCNC: 11 U/L (ref 7–45)
ANION GAP SERPL CALC-SCNC: 11 MMOL/L (ref 10–20)
AST SERPL W P-5'-P-CCNC: 13 U/L (ref 9–39)
BILIRUB SERPL-MCNC: 0.4 MG/DL (ref 0–1.2)
BUN SERPL-MCNC: 8 MG/DL (ref 6–23)
CALCIUM SERPL-MCNC: 9.3 MG/DL (ref 8.6–10.3)
CHLORIDE SERPL-SCNC: 100 MMOL/L (ref 98–107)
CO2 SERPL-SCNC: 32 MMOL/L (ref 21–32)
CREAT SERPL-MCNC: 0.87 MG/DL (ref 0.5–1.05)
EGFRCR SERPLBLD CKD-EPI 2021: 84 ML/MIN/1.73M*2
GLUCOSE SERPL-MCNC: 101 MG/DL (ref 74–99)
POTASSIUM SERPL-SCNC: 4 MMOL/L (ref 3.5–5.3)
PROT SERPL-MCNC: 7 G/DL (ref 6.4–8.2)
SODIUM SERPL-SCNC: 139 MMOL/L (ref 136–145)
TSH SERPL-ACNC: 1.71 MIU/L (ref 0.44–3.98)

## 2025-08-22 PROCEDURE — 84443 ASSAY THYROID STIM HORMONE: CPT | Performed by: FAMILY MEDICINE

## 2025-08-22 PROCEDURE — 80053 COMPREHEN METABOLIC PANEL: CPT | Performed by: FAMILY MEDICINE

## 2025-08-22 PROCEDURE — 36415 COLL VENOUS BLD VENIPUNCTURE: CPT | Performed by: FAMILY MEDICINE

## 2025-12-17 ENCOUNTER — APPOINTMENT (OUTPATIENT)
Dept: PRIMARY CARE | Facility: CLINIC | Age: 45
End: 2025-12-17
Payer: COMMERCIAL